# Patient Record
Sex: MALE | Race: WHITE | NOT HISPANIC OR LATINO | Employment: OTHER | URBAN - METROPOLITAN AREA
[De-identification: names, ages, dates, MRNs, and addresses within clinical notes are randomized per-mention and may not be internally consistent; named-entity substitution may affect disease eponyms.]

---

## 2018-02-21 DIAGNOSIS — M54.16 LUMBAR RADICULOPATHY: Primary | ICD-10-CM

## 2018-02-23 ENCOUNTER — HOSPITAL ENCOUNTER (OUTPATIENT)
Dept: RADIOLOGY | Facility: OTHER | Age: 83
Discharge: HOME | End: 2018-02-23
Attending: PAIN MEDICINE
Payer: MEDICARE

## 2018-02-23 DIAGNOSIS — M54.16 LUMBAR RADICULOPATHY: ICD-10-CM

## 2018-02-23 PROCEDURE — 72148 MRI LUMBAR SPINE W/O DYE: CPT | Mod: TC

## 2018-02-23 PROCEDURE — 72148 MRI LUMBAR SPINE W/O DYE: CPT | Mod: 26,,, | Performed by: RADIOLOGY

## 2018-07-31 ENCOUNTER — HOSPITAL ENCOUNTER (OUTPATIENT)
Facility: OTHER | Age: 83
Discharge: NURSING, REHAB OR LONG-TERM CARE | End: 2018-08-03
Attending: EMERGENCY MEDICINE | Admitting: HOSPITALIST
Payer: MEDICARE

## 2018-07-31 DIAGNOSIS — T14.90XA TRAUMA: ICD-10-CM

## 2018-07-31 DIAGNOSIS — S51.012A ELBOW LACERATION, LEFT, INITIAL ENCOUNTER: ICD-10-CM

## 2018-07-31 DIAGNOSIS — S32.810A: ICD-10-CM

## 2018-07-31 DIAGNOSIS — S32.030A CLOSED COMPRESSION FRACTURE OF THIRD LUMBAR VERTEBRA, INITIAL ENCOUNTER: ICD-10-CM

## 2018-07-31 DIAGNOSIS — N17.9 AKI (ACUTE KIDNEY INJURY): ICD-10-CM

## 2018-07-31 DIAGNOSIS — R53.81 DEBILITY: ICD-10-CM

## 2018-07-31 DIAGNOSIS — S32.444A CLOSED NONDISPLACED FRACTURE OF POSTERIOR COLUMN OF RIGHT ACETABULUM, INITIAL ENCOUNTER: ICD-10-CM

## 2018-07-31 DIAGNOSIS — I25.10 CORONARY ARTERY DISEASE INVOLVING NATIVE CORONARY ARTERY OF NATIVE HEART WITHOUT ANGINA PECTORIS: ICD-10-CM

## 2018-07-31 DIAGNOSIS — E78.2 MIXED HYPERLIPIDEMIA: ICD-10-CM

## 2018-07-31 DIAGNOSIS — D63.8 ANEMIA OF CHRONIC DISEASE: ICD-10-CM

## 2018-07-31 DIAGNOSIS — S32.810A MULTIPLE CLOSED FRACTURES OF PELVIS WITH DISRUPTION OF PELVIC RING, INITIAL ENCOUNTER: Primary | ICD-10-CM

## 2018-07-31 LAB
ANION GAP SERPL CALC-SCNC: 11 MMOL/L
BASOPHILS # BLD AUTO: ABNORMAL K/UL
BASOPHILS NFR BLD: 0 %
BUN SERPL-MCNC: 32 MG/DL
CALCIUM SERPL-MCNC: 9.4 MG/DL
CHLORIDE SERPL-SCNC: 105 MMOL/L
CO2 SERPL-SCNC: 20 MMOL/L
CREAT SERPL-MCNC: 1.5 MG/DL
DACRYOCYTES BLD QL SMEAR: ABNORMAL
DIFFERENTIAL METHOD: ABNORMAL
EOSINOPHIL # BLD AUTO: ABNORMAL K/UL
EOSINOPHIL NFR BLD: 0 %
ERYTHROCYTE [DISTWIDTH] IN BLOOD BY AUTOMATED COUNT: 14.2 %
EST. GFR  (AFRICAN AMERICAN): 45 ML/MIN/1.73 M^2
EST. GFR  (NON AFRICAN AMERICAN): 39 ML/MIN/1.73 M^2
GLUCOSE SERPL-MCNC: 97 MG/DL
HCT VFR BLD AUTO: 34.3 %
HGB BLD-MCNC: 11.3 G/DL
LYMPHOCYTES # BLD AUTO: ABNORMAL K/UL
LYMPHOCYTES NFR BLD: 24 %
MCH RBC QN AUTO: 31.3 PG
MCHC RBC AUTO-ENTMCNC: 32.9 G/DL
MCV RBC AUTO: 95 FL
MONOCYTES # BLD AUTO: ABNORMAL K/UL
MONOCYTES NFR BLD: 7 %
NEUTROPHILS NFR BLD: 62 %
NEUTS BAND NFR BLD MANUAL: 7 %
PLATELET # BLD AUTO: 177 K/UL
PMV BLD AUTO: 9.9 FL
POTASSIUM SERPL-SCNC: 4.6 MMOL/L
RBC # BLD AUTO: 3.61 M/UL
SODIUM SERPL-SCNC: 136 MMOL/L
WBC # BLD AUTO: 17.51 K/UL

## 2018-07-31 PROCEDURE — 12032 INTMD RPR S/A/T/EXT 2.6-7.5: CPT

## 2018-07-31 PROCEDURE — 80048 BASIC METABOLIC PNL TOTAL CA: CPT

## 2018-07-31 PROCEDURE — 99285 EMERGENCY DEPT VISIT HI MDM: CPT | Mod: 25

## 2018-07-31 PROCEDURE — 96374 THER/PROPH/DIAG INJ IV PUSH: CPT | Mod: 59

## 2018-07-31 PROCEDURE — 85027 COMPLETE CBC AUTOMATED: CPT

## 2018-07-31 PROCEDURE — 85007 BL SMEAR W/DIFF WBC COUNT: CPT | Mod: NCS

## 2018-07-31 PROCEDURE — 63600175 PHARM REV CODE 636 W HCPCS: Performed by: EMERGENCY MEDICINE

## 2018-07-31 PROCEDURE — G0378 HOSPITAL OBSERVATION PER HR: HCPCS

## 2018-07-31 RX ORDER — DORZOLAMIDE HCL 20 MG/ML
1 SOLUTION/ DROPS OPHTHALMIC 3 TIMES DAILY
COMMUNITY

## 2018-07-31 RX ORDER — NAPROXEN SODIUM 220 MG
220 TABLET ORAL DAILY PRN
COMMUNITY

## 2018-07-31 RX ORDER — CETIRIZINE HYDROCHLORIDE 5 MG/1
5 TABLET ORAL DAILY
Status: ON HOLD | COMMUNITY
End: 2018-08-01 | Stop reason: CLARIF

## 2018-07-31 RX ORDER — DULOXETIN HYDROCHLORIDE 20 MG/1
20 CAPSULE, DELAYED RELEASE ORAL DAILY
COMMUNITY

## 2018-07-31 RX ORDER — MORPHINE SULFATE 2 MG/ML
6 INJECTION, SOLUTION INTRAMUSCULAR; INTRAVENOUS
Status: COMPLETED | OUTPATIENT
Start: 2018-07-31 | End: 2018-07-31

## 2018-07-31 RX ORDER — LIDOCAINE HYDROCHLORIDE 30 MG/G
CREAM TOPICAL
Status: ON HOLD | COMMUNITY
End: 2018-08-01 | Stop reason: CLARIF

## 2018-07-31 RX ADMIN — MORPHINE SULFATE 6 MG: 2 INJECTION, SOLUTION INTRAMUSCULAR; INTRAVENOUS at 10:07

## 2018-08-01 PROBLEM — D63.8 ANEMIA OF CHRONIC DISEASE: Status: ACTIVE | Noted: 2018-08-01

## 2018-08-01 PROBLEM — R53.81 DEBILITY: Status: ACTIVE | Noted: 2018-08-01

## 2018-08-01 PROBLEM — N17.9 AKI (ACUTE KIDNEY INJURY): Status: ACTIVE | Noted: 2018-08-01

## 2018-08-01 PROBLEM — I25.10 CORONARY ARTERY DISEASE INVOLVING NATIVE CORONARY ARTERY OF NATIVE HEART WITHOUT ANGINA PECTORIS: Status: ACTIVE | Noted: 2018-08-01

## 2018-08-01 LAB
ALBUMIN SERPL BCP-MCNC: 3.6 G/DL
ALP SERPL-CCNC: 72 U/L
ALT SERPL W/O P-5'-P-CCNC: 19 U/L
ANION GAP SERPL CALC-SCNC: 8 MMOL/L
AST SERPL-CCNC: 31 U/L
BASOPHILS # BLD AUTO: 0.03 K/UL
BASOPHILS NFR BLD: 0.2 %
BILIRUB SERPL-MCNC: 0.9 MG/DL
BILIRUB UR QL STRIP: NEGATIVE
BUN SERPL-MCNC: 31 MG/DL
CALCIUM SERPL-MCNC: 8.7 MG/DL
CHLORIDE SERPL-SCNC: 106 MMOL/L
CHOLEST SERPL-MCNC: 141 MG/DL
CHOLEST/HDLC SERPL: 2.8 {RATIO}
CLARITY UR: CLEAR
CO2 SERPL-SCNC: 21 MMOL/L
COLOR UR: YELLOW
CREAT SERPL-MCNC: 1.3 MG/DL
DIFFERENTIAL METHOD: ABNORMAL
EOSINOPHIL # BLD AUTO: 0.1 K/UL
EOSINOPHIL NFR BLD: 0.9 %
ERYTHROCYTE [DISTWIDTH] IN BLOOD BY AUTOMATED COUNT: 14.2 %
EST. GFR  (AFRICAN AMERICAN): 53 ML/MIN/1.73 M^2
EST. GFR  (NON AFRICAN AMERICAN): 46 ML/MIN/1.73 M^2
GLUCOSE SERPL-MCNC: 127 MG/DL
GLUCOSE UR QL STRIP: NEGATIVE
HCT VFR BLD AUTO: 31.2 %
HDLC SERPL-MCNC: 51 MG/DL
HDLC SERPL: 36.2 %
HGB BLD-MCNC: 10.2 G/DL
HGB UR QL STRIP: NEGATIVE
KETONES UR QL STRIP: NEGATIVE
LDLC SERPL CALC-MCNC: 80.4 MG/DL
LEUKOCYTE ESTERASE UR QL STRIP: NEGATIVE
LYMPHOCYTES # BLD AUTO: 1.6 K/UL
LYMPHOCYTES NFR BLD: 12.3 %
MAGNESIUM SERPL-MCNC: 1.8 MG/DL
MCH RBC QN AUTO: 30.7 PG
MCHC RBC AUTO-ENTMCNC: 32.7 G/DL
MCV RBC AUTO: 94 FL
MONOCYTES # BLD AUTO: 1.6 K/UL
MONOCYTES NFR BLD: 12.3 %
NEUTROPHILS # BLD AUTO: 9.7 K/UL
NEUTROPHILS NFR BLD: 73.8 %
NITRITE UR QL STRIP: NEGATIVE
NONHDLC SERPL-MCNC: 90 MG/DL
PH UR STRIP: 7 [PH] (ref 5–8)
PHOSPHATE SERPL-MCNC: 3.5 MG/DL
PLATELET # BLD AUTO: 153 K/UL
PMV BLD AUTO: 10.2 FL
POTASSIUM SERPL-SCNC: 4 MMOL/L
PROT SERPL-MCNC: 6.5 G/DL
PROT UR QL STRIP: NEGATIVE
RBC # BLD AUTO: 3.32 M/UL
SODIUM SERPL-SCNC: 135 MMOL/L
SP GR UR STRIP: 1.01 (ref 1–1.03)
TRIGL SERPL-MCNC: 48 MG/DL
URN SPEC COLLECT METH UR: NORMAL
UROBILINOGEN UR STRIP-ACNC: NEGATIVE EU/DL
WBC # BLD AUTO: 13.18 K/UL

## 2018-08-01 PROCEDURE — 12032 INTMD RPR S/A/T/EXT 2.6-7.5: CPT

## 2018-08-01 PROCEDURE — G8979 MOBILITY GOAL STATUS: HCPCS | Mod: CK

## 2018-08-01 PROCEDURE — 83735 ASSAY OF MAGNESIUM: CPT

## 2018-08-01 PROCEDURE — 97530 THERAPEUTIC ACTIVITIES: CPT | Mod: 59

## 2018-08-01 PROCEDURE — 94761 N-INVAS EAR/PLS OXIMETRY MLT: CPT

## 2018-08-01 PROCEDURE — G0378 HOSPITAL OBSERVATION PER HR: HCPCS

## 2018-08-01 PROCEDURE — 81003 URINALYSIS AUTO W/O SCOPE: CPT

## 2018-08-01 PROCEDURE — 63600175 PHARM REV CODE 636 W HCPCS: Performed by: NURSE PRACTITIONER

## 2018-08-01 PROCEDURE — 97162 PT EVAL MOD COMPLEX 30 MIN: CPT

## 2018-08-01 PROCEDURE — 80061 LIPID PANEL: CPT

## 2018-08-01 PROCEDURE — G8978 MOBILITY CURRENT STATUS: HCPCS | Mod: CN

## 2018-08-01 PROCEDURE — 85025 COMPLETE CBC W/AUTO DIFF WBC: CPT

## 2018-08-01 PROCEDURE — 99220 PR INITIAL OBSERVATION CARE,LEVL III: CPT | Mod: GC,,, | Performed by: HOSPITALIST

## 2018-08-01 PROCEDURE — 84100 ASSAY OF PHOSPHORUS: CPT

## 2018-08-01 PROCEDURE — 27000221 HC OXYGEN, UP TO 24 HOURS

## 2018-08-01 PROCEDURE — 25000003 PHARM REV CODE 250: Performed by: NURSE PRACTITIONER

## 2018-08-01 PROCEDURE — 80053 COMPREHEN METABOLIC PANEL: CPT

## 2018-08-01 PROCEDURE — 25000003 PHARM REV CODE 250: Performed by: EMERGENCY MEDICINE

## 2018-08-01 RX ORDER — LATANOPROST 50 UG/ML
1 SOLUTION/ DROPS OPHTHALMIC NIGHTLY
Status: DISCONTINUED | OUTPATIENT
Start: 2018-08-01 | End: 2018-08-03 | Stop reason: HOSPADM

## 2018-08-01 RX ORDER — SODIUM CHLORIDE 9 MG/ML
INJECTION, SOLUTION INTRAVENOUS CONTINUOUS
Status: DISCONTINUED | OUTPATIENT
Start: 2018-08-01 | End: 2018-08-03 | Stop reason: HOSPADM

## 2018-08-01 RX ORDER — PRAVASTATIN SODIUM 20 MG/1
40 TABLET ORAL DAILY
Status: DISCONTINUED | OUTPATIENT
Start: 2018-08-01 | End: 2018-08-03 | Stop reason: HOSPADM

## 2018-08-01 RX ORDER — TRAMADOL HYDROCHLORIDE 50 MG/1
50 TABLET ORAL EVERY 4 HOURS PRN
Status: DISCONTINUED | OUTPATIENT
Start: 2018-08-01 | End: 2018-08-02

## 2018-08-01 RX ORDER — SODIUM CHLORIDE 0.9 % (FLUSH) 0.9 %
5 SYRINGE (ML) INJECTION
Status: DISCONTINUED | OUTPATIENT
Start: 2018-08-01 | End: 2018-08-03 | Stop reason: HOSPADM

## 2018-08-01 RX ORDER — ONDANSETRON 8 MG/1
8 TABLET, ORALLY DISINTEGRATING ORAL EVERY 8 HOURS PRN
Status: DISCONTINUED | OUTPATIENT
Start: 2018-08-01 | End: 2018-08-03 | Stop reason: HOSPADM

## 2018-08-01 RX ORDER — ACETAMINOPHEN 325 MG/1
650 TABLET ORAL EVERY 4 HOURS PRN
Status: DISCONTINUED | OUTPATIENT
Start: 2018-08-01 | End: 2018-08-03 | Stop reason: HOSPADM

## 2018-08-01 RX ORDER — MORPHINE SULFATE 2 MG/ML
2 INJECTION, SOLUTION INTRAMUSCULAR; INTRAVENOUS EVERY 4 HOURS PRN
Status: DISCONTINUED | OUTPATIENT
Start: 2018-08-01 | End: 2018-08-01

## 2018-08-01 RX ORDER — FAMOTIDINE 20 MG/1
20 TABLET, FILM COATED ORAL 2 TIMES DAILY
Status: DISCONTINUED | OUTPATIENT
Start: 2018-08-01 | End: 2018-08-03 | Stop reason: HOSPADM

## 2018-08-01 RX ORDER — FERROUS SULFATE, DRIED 160(50) MG
1 TABLET, EXTENDED RELEASE ORAL 2 TIMES DAILY WITH MEALS
Status: DISCONTINUED | OUTPATIENT
Start: 2018-08-01 | End: 2018-08-03 | Stop reason: HOSPADM

## 2018-08-01 RX ORDER — DORZOLAMIDE HCL 20 MG/ML
1 SOLUTION/ DROPS OPHTHALMIC 3 TIMES DAILY
Status: DISCONTINUED | OUTPATIENT
Start: 2018-08-01 | End: 2018-08-03 | Stop reason: HOSPADM

## 2018-08-01 RX ORDER — ASCORBIC ACID 250 MG
500 TABLET ORAL DAILY
Status: DISCONTINUED | OUTPATIENT
Start: 2018-08-01 | End: 2018-08-03 | Stop reason: HOSPADM

## 2018-08-01 RX ORDER — AMOXICILLIN 250 MG
1 CAPSULE ORAL EVERY OTHER DAY
Status: DISCONTINUED | OUTPATIENT
Start: 2018-08-01 | End: 2018-08-03 | Stop reason: HOSPADM

## 2018-08-01 RX ORDER — LIDOCAINE HYDROCHLORIDE 10 MG/ML
10 INJECTION INFILTRATION; PERINEURAL
Status: COMPLETED | OUTPATIENT
Start: 2018-08-01 | End: 2018-08-01

## 2018-08-01 RX ORDER — PREGABALIN 25 MG/1
25 CAPSULE ORAL DAILY
Status: DISCONTINUED | OUTPATIENT
Start: 2018-08-01 | End: 2018-08-03 | Stop reason: HOSPADM

## 2018-08-01 RX ORDER — ALFUZOSIN HYDROCHLORIDE 10 MG/1
10 TABLET, EXTENDED RELEASE ORAL
Status: DISCONTINUED | OUTPATIENT
Start: 2018-08-01 | End: 2018-08-03 | Stop reason: HOSPADM

## 2018-08-01 RX ORDER — FINASTERIDE 5 MG/1
5 TABLET, FILM COATED ORAL DAILY
Status: DISCONTINUED | OUTPATIENT
Start: 2018-08-01 | End: 2018-08-03 | Stop reason: HOSPADM

## 2018-08-01 RX ORDER — DULOXETIN HYDROCHLORIDE 20 MG/1
20 CAPSULE, DELAYED RELEASE ORAL DAILY
Status: DISCONTINUED | OUTPATIENT
Start: 2018-08-01 | End: 2018-08-03 | Stop reason: HOSPADM

## 2018-08-01 RX ORDER — HYDROCODONE BITARTRATE AND ACETAMINOPHEN 7.5; 325 MG/1; MG/1
1 TABLET ORAL EVERY 4 HOURS PRN
Status: DISCONTINUED | OUTPATIENT
Start: 2018-08-01 | End: 2018-08-02

## 2018-08-01 RX ADMIN — MORPHINE SULFATE 2 MG: 2 INJECTION, SOLUTION INTRAMUSCULAR; INTRAVENOUS at 02:08

## 2018-08-01 RX ADMIN — DORZOLAMIDE HYDROCHLORIDE 1 DROP: 20 SOLUTION/ DROPS OPHTHALMIC at 09:08

## 2018-08-01 RX ADMIN — ACETAMINOPHEN 650 MG: 325 TABLET, FILM COATED ORAL at 09:08

## 2018-08-01 RX ADMIN — ALFUZOSIN HYDROCHLORIDE 10 MG: 10 TABLET ORAL at 08:08

## 2018-08-01 RX ADMIN — LATANOPROST 1 DROP: 50 SOLUTION OPHTHALMIC at 09:08

## 2018-08-01 RX ADMIN — DULOXETINE HYDROCHLORIDE 20 MG: 20 CAPSULE, DELAYED RELEASE ORAL at 09:08

## 2018-08-01 RX ADMIN — FAMOTIDINE 20 MG: 20 TABLET ORAL at 09:08

## 2018-08-01 RX ADMIN — SODIUM CHLORIDE: 0.9 INJECTION, SOLUTION INTRAVENOUS at 02:08

## 2018-08-01 RX ADMIN — CALCIUM CARBONATE-VITAMIN D TAB 500 MG-200 UNIT 1 TABLET: 500-200 TAB at 09:08

## 2018-08-01 RX ADMIN — CALCIUM CARBONATE-VITAMIN D TAB 500 MG-200 UNIT 1 TABLET: 500-200 TAB at 04:08

## 2018-08-01 RX ADMIN — FINASTERIDE 5 MG: 5 TABLET, FILM COATED ORAL at 09:08

## 2018-08-01 RX ADMIN — PREGABALIN 25 MG: 25 CAPSULE ORAL at 09:08

## 2018-08-01 RX ADMIN — MORPHINE SULFATE 2 MG: 2 INJECTION, SOLUTION INTRAMUSCULAR; INTRAVENOUS at 09:08

## 2018-08-01 RX ADMIN — PRAVASTATIN SODIUM 40 MG: 20 TABLET ORAL at 09:08

## 2018-08-01 RX ADMIN — SODIUM CHLORIDE: 0.9 INJECTION, SOLUTION INTRAVENOUS at 10:08

## 2018-08-01 RX ADMIN — Medication 500 MG: at 09:08

## 2018-08-01 RX ADMIN — STANDARDIZED SENNA CONCENTRATE AND DOCUSATE SODIUM 1 TABLET: 8.6; 5 TABLET, FILM COATED ORAL at 09:08

## 2018-08-01 RX ADMIN — DORZOLAMIDE HYDROCHLORIDE 1 DROP: 20 SOLUTION/ DROPS OPHTHALMIC at 03:08

## 2018-08-01 RX ADMIN — LIDOCAINE HYDROCHLORIDE 10 ML: 10 INJECTION, SOLUTION INFILTRATION; PERINEURAL at 12:08

## 2018-08-01 RX ADMIN — BETAXOLOL HYDROCHLORIDE 1 DROP: 2.8 SUSPENSION/ DROPS OPHTHALMIC at 11:08

## 2018-08-01 NOTE — PLAN OF CARE
Problem: Patient Care Overview  Goal: Plan of Care Review  Outcome: Ongoing (interventions implemented as appropriate)  Plan of Care reviewed with patient and daughter. Pt free from falls or injury. Pt transferred from bed to chair with PT earlier today. Denies any c/o discomfort @ this time. Pt is incontinent of B/B function with incontinent care provided as needed. Purposeful rounding done. Pt oriented to person and place. Confused to time and situation. Dsg to Rt elbow CDI. Turned and repositioned q2h. Daughter @ bedside. Call light in reach. Bed alarm on and functioning well. Bed in lowest position and locked.

## 2018-08-01 NOTE — PLAN OF CARE
Pt was disoriented x3 and daughter, Ana M [phone] present and complete discharge assessment, verified PCP.  Pt is a resident at [facility] nursing unit.  Pt's wife is also a resident at [place] on memory care unit.  Pt has RW and WC.  Daughter reported pt was cognitive intact per to admit.  Pt's daughter can provide transportation upon discharge.  NOTE:  Daughter Ana M lives in [place].     08/01/18 1242   Discharge Assessment   Assessment Type Discharge Planning Assessment   Confirmed/corrected address and phone number on facesheet? Yes   Assessment information obtained from? (Ana M, daughter)   Communicated expected length of stay with patient/caregiver no   Prior to hospitilization cognitive status: Alert/Oriented   Prior to hospitalization functional status: Partially Dependent;Assistive Equipment;Needs Assistance   Current cognitive status: Not Oriented to Place;Not Oriented to Time   Current Functional Status: Partially Dependent   Lives With facility resident   Able to Return to Prior Arrangements yes   Is patient able to care for self after discharge? No   Patient's perception of discharge disposition (nursing home)   Readmission Within The Last 30 Days no previous admission in last 30 days   Patient currently being followed by outpatient case management? No   Patient currently receives any other outside agency services? No   Equipment Currently Used at Home walker, rolling;wheelchair   Do you have any problems affording any of your prescribed medications? No   Is the patient taking medications as prescribed? yes   Does the patient have transportation home? Yes   Transportation Available family or friend will provide   Does the patient receive services at [facility]? No   Discharge Plan A Return to nursing home   Patient/Family In Agreement With Plan yes

## 2018-08-01 NOTE — SUBJECTIVE & OBJECTIVE
Past Medical History:   Diagnosis Date    Arthritis     BPH (benign prostatic hyperplasia)     CAD (coronary artery disease)     Edema     Falls     GERD (gastroesophageal reflux disease)     Glaucoma     HLD (hyperlipidemia)     Macular degeneration     Neuropathy     Seasonal allergies     Skin cancer     Vitamin B 12 deficiency     Vitamin D deficiency        Past Surgical History:   Procedure Laterality Date    BACK SURGERY         Review of patient's allergies indicates:  No Known Allergies    No current facility-administered medications on file prior to encounter.      Current Outpatient Prescriptions on File Prior to Encounter   Medication Sig    alfuzosin (UROXATRAL) 10 mg Tb24 Take 10 mg by mouth daily with breakfast.    ASPIRIN (ASPIR-81 ORAL) Take by mouth.    FINASTERIDE ORAL Take 5 mg by mouth.     latanoprost 0.005 % ophthalmic solution 1 drop every evening.    PRAVASTATIN SODIUM (PRAVASTATIN ORAL) Take 40 mg by mouth once daily.     PREGABALIN (LYRICA ORAL) Take 25 mg by mouth.     ranitidine (ZANTAC) 150 MG capsule Take 150 mg by mouth 2 (two) times daily.    ascorbic acid (VITAMIN C) 500 MG tablet Take 500 mg by mouth once daily.    calcium-vitamin D3 (CALCIUM 500 + D) 500 mg(1,250mg) -200 unit per tablet Take 1 tablet by mouth 2 (two) times daily with meals.    MULTIVIT-IRON-MIN-FOLIC ACID 3,500-18-0.4 UNIT-MG-MG ORAL CHEW Take by mouth.    omega-3 fatty acids-vitamin E (FISH OIL) 1,000 mg Cap Take by mouth.    senna-docusate 8.6-50 mg (SENNA WITH DOCUSATE SODIUM) 8.6-50 mg per tablet Take 1 tablet by mouth every other day.     Family History     None        Social History Main Topics    Smoking status: Former Smoker    Smokeless tobacco: Not on file    Alcohol use No    Drug use: No    Sexual activity: Not on file     Review of Systems   Constitutional: Negative for activity change, appetite change, fatigue and fever.   HENT: Negative for congestion, ear pain and  postnasal drip.    Eyes: Negative for discharge.   Respiratory: Negative for apnea, shortness of breath and wheezing.    Cardiovascular: Negative for chest pain and leg swelling.   Gastrointestinal: Negative for abdominal distention, abdominal pain, nausea and vomiting.   Endocrine: Negative for polydipsia, polyphagia and polyuria.   Genitourinary: Negative for difficulty urinating, flank pain, frequency, hematuria and urgency.   Musculoskeletal: Positive for arthralgias, back pain, gait problem and myalgias. Negative for joint swelling.   Skin: Negative for pallor and rash.   Allergic/Immunologic: Negative for environmental allergies and food allergies.   Neurological: Negative for dizziness, speech difficulty, weakness, light-headedness and headaches.   Hematological: Does not bruise/bleed easily.   Psychiatric/Behavioral: Negative for agitation.     Objective:     Vital Signs (Most Recent):  Temp: 97.6 °F (36.4 °C) (07/31/18 2056)  Pulse: 102 (07/31/18 2334)  Resp: 17 (07/31/18 2056)  BP: (!) 162/68 (07/31/18 2334)  SpO2: 95 % (07/31/18 2358) Vital Signs (24h Range):  Temp:  [97.6 °F (36.4 °C)] 97.6 °F (36.4 °C)  Pulse:  [] 102  Resp:  [17] 17  SpO2:  [94 %-98 %] 95 %  BP: (140-197)/(64-87) 162/68     Weight: 68.9 kg (152 lb)  Body mass index is 20.61 kg/m².    Physical Exam   Constitutional: He is oriented to person, place, and time. He appears well-developed. He appears ill.   HENT:   Head: Normocephalic.   Eyes: Conjunctivae are normal.   Neck: Normal range of motion. Neck supple.   Cardiovascular: Regular rhythm, normal heart sounds and intact distal pulses.  Tachycardia present.    Pulses:       Radial pulses are 2+ on the right side, and 2+ on the left side.        Dorsalis pedis pulses are 1+ on the right side, and 1+ on the left side.   Pulmonary/Chest: Effort normal. He has decreased breath sounds in the right lower field and the left lower field.   Abdominal: Soft. He exhibits no distension. Bowel  sounds are increased. There is no tenderness.   Musculoskeletal: Normal range of motion.        Lumbar back: He exhibits tenderness and pain.   Neurological: He is alert and oriented to person, place, and time. GCS eye subscore is 4. GCS verbal subscore is 5. GCS motor subscore is 6.   Skin: Skin is warm and dry. There is pallor.   Psychiatric: He has a normal mood and affect. His speech is normal and behavior is normal.           Significant Labs:   CBC:   Recent Labs  Lab 07/31/18 2136   WBC 17.51*   HGB 11.3*   HCT 34.3*        CMP:   Recent Labs  Lab 07/31/18 2136      K 4.6      CO2 20*   GLU 97   BUN 32*   CREATININE 1.5*   CALCIUM 9.4   ANIONGAP 11   EGFRNONAA 39*       Significant Imaging: I have reviewed all pertinent imaging results/findings within the past 24 hours.

## 2018-08-01 NOTE — PLAN OF CARE
Problem: Physical Therapy Goal  Goal: Physical Therapy Goal  Goals to be met by: 8/15/18    Patient will increase functional independence with mobility by performin. Supine to sit with min A.  2. Sit to supine with min A.   3. Rolling R and L with min A using log roll technique.   4. Sitting at edge of bed >5 minutes with CGA.   5. PT will assess sit<>stand.     Outcome: Ongoing (interventions implemented as appropriate)  Patient evaluated by PT. Pt requires total assist for bed mobility using log roll technique. He tolerates sitting up at edge of bed x 4 minutes, duration limited by reported L hip and thigh pain 10/10 requesting to return to supine. Pt drowsy throughout session. Will continue to follow and progress as tolerated. Please see progress note for detailed plan of care and recommendations (pending improved alertness and improved activity tolerance).

## 2018-08-01 NOTE — ASSESSMENT & PLAN NOTE
CT- Complex, mildly displaced fracture of the right acetabulum involving the anterior and posterior columns and medial wall with extension along the superior pubic ramus.  Minimally displaced fracture of the right inferior pubic ramus.  Compression fracture of the L3 vertebral body, progressed when compared to the previous exam noting retropulsion of the posterior-superior corner that contributes to severe spinal canal stenosis.  Moderate bilateral femoroacetabular osteoarthritis     Consult Ortho  Morphine for pain control  PT/OT consult after Ortho gives recommendations

## 2018-08-01 NOTE — PT/OT/SLP EVAL
"Physical Therapy Evaluation and Treatment    Patient Name:  Tushar Ybarra   MRN:  [id number]    Recommendations:     Discharge Recommendations:   (pending pain control, activity tolerance, increased alertness)   Discharge Equipment Recommendations:  (pending progress)   Barriers to discharge: None with return to nursing care at [facility]    Assessment:     Tushar Ybarra is a 89 y.o. male admitted with a medical diagnosis of Multiple closed pelvic fractures with disruption of pelvic [place].  He presents with the following impairments/functional limitations:  pain, decreased ROM, decreased lower extremity function, impaired self care skills, impaired functional mobilty, weakness, impaired endurance, orthopedic precautions (impaired alertness). Patient evaluated by PT. Pt requires total assist for bed mobility using log roll technique. He tolerates sitting up at edge of bed x 4 minutes, duration limited by reported L hip and thigh pain 10/10 requesting to return to supine. Pt drowsy throughout session. Will continue to follow and progress as tolerated.     Rehab Prognosis:  Fair; patient would benefit from acute skilled PT services to address these deficits and reach maximum level of function.      Recent Surgery: * No surgery found *      Plan:     During this hospitalization, patient to be seen daily to address the above listed problems via gait training, therapeutic activities, therapeutic exercises, neuromuscular re-education, wheelchair management/training  · Plan of Care Expires:  08/31/18   Plan of Care Reviewed with: patient    Subjective     Communicated with nurse prior to session.  Patient found supine in bed with HOB elevated upon PT entry to room, agreeable to evaluation.      Chief Complaint: pain  Patient comments/goals: agreeable to sit up at the edge of the bed; "I can't do this anymore" and "let me back down" when sitting up at edge of bed  Pain/Comfort:  · Pain Rating 1: 10/10 (with " "activity )  · Location - Side 1: Left  · Location 1:  (hip and anterior thigh)  · Pain Addressed 1: Pre-medicate for activity, Cessation of Activity, Nurse notified  · Pain Rating Post-Intervention 1: 0/10 (at rest)    Patients cultural, spiritual, Jehovah's witness conflicts given the current situation: none specified    Living Environment:  Pt is a resident of [facility] in the nursing home section on the first floor "in the main building." He has a w/c accessible bathroom with grab bars.    Prior to admission, patients level of function was mod I with rollator for household distances. He has assist in the facility for dressing, however will intermittently dress himself in the facility; he has the option to use assist for toileting, however typically toilets without requesting assistance. He feeds himself. The facility assists with food prep and cleaning. Patient uses the following equipment: rollator, wheelchair. Pt wll have access to nursing home DME  Upon discharge, patient will have assistance from nursing home staff.    Objective:     Patient found with: oxygen (3L/min via nasal cannula)     General Precautions: Standard,  (fall risk)   Orthopedic Precautions:spinal precautions, RLE partial weight bearing   Braces: N/A     Exams:  Cognition: Patient is oriented to name and  and follows approximately 50% of one step verbal commands. Pt presented with impaired alertness with occasional verbal cues for eyes open during session.    Posture:    -       Rounded shoulders  -       Forward head  -       Kyphosis  Sensation: No c/o paresthesias. Pain as above.   Skin Integrity: Visible skin intact  Edema: None noted  Coordination: No coordination impairments identified with functional mobility. No formal testing performed.  LE ROM/Strength: Pt demonstrated significantly impaired ROM at hips and knees with hip flexion and knee extension, approximately 2+/5. Unable to perform SLR, no heel clearance with attempt " for R short arc quad, heel clearance with L short arc quad  Tone: No tone impairments identified during functional mobility.   Vital signs: SpO2: 98% on 3L/min via nasal cannula; Heart rate 86 bpm    Functional Mobility:  Bed Mobility:     Supine to Sit: total assist using log roll technique  Sit to Supine: total assist using log roll technique with verbal cues for sequencing  Rolling R and L: total assist using log roll technique with verbal cues for sequencing  Bridging: total assist with draw sheet [facility]  Transfers:      Did not perform; unable to tolerate sitting prior to attempt to stand  Balance: CGA for static sitting at edge of bed x 4 minutes with c/o worsening pain. Pt unable to perform lateral scooting with cues.       AM-PAC 6 CLICK MOBILITY  Total Score:6       Therapeutic Activities and Exercises:  -Discussed with patient and daughter PT plan of care, safety with OOB mobility, log roll technique, repositioning for pressure relief, floating heels  -Total assist for floating heels at end session. Discussed with SERGIO Schneider recommendations for wedge pillow and q2 turns due to pt's impaired mobility.       Patient left supine in bed with HOB elevated > 60 degrees with all lines intact, call button in reach, bed alarm on, nurse notified and daughter present.    GOALS:    Physical Therapy Goals        Problem: Physical Therapy Goal    Goal Priority Disciplines Outcome Goal Variances Interventions   Physical Therapy Goal     PT/OT, PT Ongoing (interventions implemented as appropriate)     Description:  Goals to be met by: 8/15/18    Patient will increase functional independence with mobility by performin. Supine to sit with min A.  2. Sit to supine with min A.   3. Rolling R and L with min A using log roll technique.   4. Sitting at edge of bed >5 minutes with CGA.   5. PT will assess sit<>stand.                       History:     Past Medical History:   Diagnosis Date    Arthritis      BPH (benign prostatic hyperplasia)     CAD (coronary artery disease)     Edema     Falls     GERD (gastroesophageal reflux disease)     Glaucoma     HLD (hyperlipidemia)     Macular degeneration     Neuropathy     Seasonal allergies     Skin cancer     Vitamin B 12 deficiency     Vitamin D deficiency        Past Surgical History:   Procedure Laterality Date    BACK SURGERY         Clinical Decision Making:     History  Co-morbidities and personal factors that may impact the plan of care Examination  Body Structures and Functions, activity limitations and participation restrictions that may impact the plan of care Clinical Presentation   Decision Making/ Complexity Score   Co-morbidities:   [] Time since onset of injury / illness / exacerbation  [] Status of current condition  []Patient's cognitive status and safety concerns    [] Multiple Medical Problems (see med hx)  Personal Factors:   [] Patient's age  [] Prior Level of function   [] Patient's home situation (environment and family support)  [] Patient's level of motivation  [] Expected progression of patient      HISTORY:(criteria)    [] 73513 - no personal factors/history    [] 83879 - has 1-2 personal factor/comorbidity     [] 01567 - has >3 personal factor/comorbidity     Body Regions:  [] Objective examination findings  [] Head     []  Neck  [] Trunk   [] Upper Extremity  [] Lower Extremity    Body Systems:  [] For communication ability, affect, cognition, language, and learning style: the assessment of the ability to make needs known, consciousness, orientation (person, place, and time), expected emotional /behavioral responses, and learning preferences (eg, learning barriers, education  needs)  [] For the neuromuscular system: a general assessment of gross coordinated movement (eg, balance, gait, locomotion, transfers, and transitions) and motor function  (motor control and motor learning)  [] For the musculoskeletal system: the assessment of  gross symmetry, gross range of motion, gross strength, height, and weight  [] For the integumentary system: the assessment of pliability(texture), presence of scar formation, skin color, and skin integrity  [] For cardiovascular/pulmonary system: the assessment of heart rate, respiratory rate, blood pressure, and edema     Activity limitations:    [] Patient's cognitive status and saf ety concerns          [] Status of current condition      [] Weight bearing restriction  [] Cardiopulmunary Restriction    Participation Restrictions:   [] Goals and goal agreement with the patient     [] Rehab potential (prognosis) and probable outcome      Examination of Body System: (criteria)    [] 18863 - addressing 1-2 elements    [] 40618 - addressing a total of 3 or more elements     [] 87492 -  Addressing a total of 4 or more elements         Clinical Presentation: (criteria)  Choose one     On examination of body system using standardized tests and measures patient presents with (CHOOSE ONE) elements from any of the following: body structures and functions, activity limitations, and/or participation restrictions.  Leading to a clinical presentation that is considered (CHOOSE ONE)                              Clinical Decision Making  (Eval Complexity):  Choose One     Time Tracking:     PT Received On: 08/01/18  PT Start Time: 1153     PT Stop Time: 1228  PT Total Time (min): 35 min     Billable Minutes: Evaluation 15 and Therapeutic Activity 20      Alison Daigle, PT  08/01/2018

## 2018-08-01 NOTE — PT/OT/SLP PROGRESS
Occupational Therapy      Patient Name:  Tushar Ybarra   MRN:  [id number]    OT evaluation and treatment orders received. Will complete evaluation s/p orthopedic consult.     Ivet Chen OTR/L  8/1/2018

## 2018-08-01 NOTE — PHARMACY MED REC
"Admission Medication Reconciliation - Pharmacy Consult Note    The home medication history was taken by Saloni Kingston.  Based on information gathered and subsequent review by the clinical pharmacist, the items below may need attention.     You may go to "Admission" then "Reconcile Home Medications" tabs to review and/or act upon these items.     Potentially problematic discrepancies with current MAR  o Patient IS NOT taking the following which was ordered upon admit  o Ascorbic acid 500 mg  o Calcium-vitamin D3 500 mg-200 units  o Patient is taking a drug DIFFERENTLY than how ordered upon admit  o Betaxolol 0.25% opth instill 1 drop into LEFT eye twice daily  - Inpatient order = instill 1 drop into BOTH eyes once daily    Prescriptions Prior to Admission   Medication Sig Dispense Refill Last Dose    alfuzosin (UROXATRAL) 10 mg Tb24 Take 10 mg by mouth daily with breakfast.   7/31/2018    ASPIRIN (ASPIR-81 ORAL) Take 1 tablet by mouth once daily.    7/31/2018    betaxolol (BETOPTIC S) 0.25 % DrpS Place 1 drop into the left eye 2 (two) times daily.    7/31/2018    dorzolamide (TRUSOPT) 2 % ophthalmic solution Place 1 drop into both eyes 3 (three) times daily.    7/31/2018    DULoxetine (CYMBALTA) 20 MG capsule Take 20 mg by mouth once daily.   7/31/2018    FINASTERIDE ORAL Take 5 mg by mouth once daily.    7/31/2018    latanoprost 0.005 % ophthalmic solution Place 1 drop into both eyes every evening.    7/31/2018    naproxen sodium (ANAPROX) 220 MG tablet Take 220 mg by mouth daily as needed.    7/31/2018    PRAVASTATIN SODIUM (PRAVASTATIN ORAL) Take 40 mg by mouth once daily.    7/31/2018    pregabalin (LYRICA) 25 MG capsule Take 25 mg by mouth once daily.    7/31/2018    ranitidine (ZANTAC) 150 MG capsule Take 150 mg by mouth 2 (two) times daily.   7/31/2018    MULTIVIT-IRON-MIN-FOLIC ACID 3,500-18-0.4 UNIT-MG-MG ORAL CHEW Take by mouth.        Please address this information as you see fit.  Feel " free to contact us if you have any questions or require assistance.    Wali Lugo, Pharm.D., BCPS  [phone]  .  .

## 2018-08-01 NOTE — H&P
[facility] Medicine  History & Physical    Patient Name: Tushar Ybarra  MRN: [id number]  Admission Date: 7/31/2018  Attending Physician: Shayne Rinaldi MD   Primary Care Provider: Dutch Umana MD         Patient information was obtained from patient and ER records.     Subjective:     Principal Problem:Multiple closed pelvic fractures with disruption of pelvic [place]    Chief Complaint:   Chief Complaint   Patient presents with    Fall     EMS reports fall in bathroom, c/o chronic back pain, no LOC, pt not on blood thinners        HPI: The patient is a 89 y.o. male, with history of arthritis and CAD, who presents s/p fall. Patient reports he was ambulating when he turned and fell. He is unsure if he hit his head on the floor, but denies LOC. He reports chronic back pain, bilateral hip pain, generalized body aches, and wound to right arm. He denies neck pain, shoulder pain, elbow pain, wrist pain, or headache. He reports history of back surgery.    Upon CT imaging, patient is found to have multiple pelvic fractures and a worsening compression fracture at L3.  Patient will be admitted for ortho consultation and pain management.    Past Medical History:   Diagnosis Date    Arthritis     BPH (benign prostatic hyperplasia)     CAD (coronary artery disease)     Edema     Falls     GERD (gastroesophageal reflux disease)     Glaucoma     HLD (hyperlipidemia)     Macular degeneration     Neuropathy     Seasonal allergies     Skin cancer     Vitamin B 12 deficiency     Vitamin D deficiency        Past Surgical History:   Procedure Laterality Date    BACK SURGERY         Review of patient's allergies indicates:  No Known Allergies    No current facility-administered medications on file prior to encounter.      Current Outpatient Prescriptions on File Prior to Encounter   Medication Sig    alfuzosin (UROXATRAL) 10 mg Tb24 Take 10 mg by mouth daily with breakfast.    ASPIRIN  (ASPIR-81 ORAL) Take by mouth.    FINASTERIDE ORAL Take 5 mg by mouth.     latanoprost 0.005 % ophthalmic solution 1 drop every evening.    PRAVASTATIN SODIUM (PRAVASTATIN ORAL) Take 40 mg by mouth once daily.     PREGABALIN (LYRICA ORAL) Take 25 mg by mouth.     ranitidine (ZANTAC) 150 MG capsule Take 150 mg by mouth 2 (two) times daily.    ascorbic acid (VITAMIN C) 500 MG tablet Take 500 mg by mouth once daily.    calcium-vitamin D3 (CALCIUM 500 + D) 500 mg(1,250mg) -200 unit per tablet Take 1 tablet by mouth 2 (two) times daily with meals.    MULTIVIT-IRON-MIN-FOLIC ACID 3,500-18-0.4 UNIT-MG-MG ORAL CHEW Take by mouth.    omega-3 fatty acids-vitamin E (FISH OIL) 1,000 mg Cap Take by mouth.    senna-docusate 8.6-50 mg (SENNA WITH DOCUSATE SODIUM) 8.6-50 mg per tablet Take 1 tablet by mouth every other day.     Family History     None        Social History Main Topics    Smoking status: Former Smoker    Smokeless tobacco: Not on file    Alcohol use No    Drug use: No    Sexual activity: Not on file     Review of Systems   Constitutional: Negative for activity change, appetite change, fatigue and fever.   HENT: Negative for congestion, ear pain and postnasal drip.    Eyes: Negative for discharge.   Respiratory: Negative for apnea, shortness of breath and wheezing.    Cardiovascular: Negative for chest pain and leg swelling.   Gastrointestinal: Negative for abdominal distention, abdominal pain, nausea and vomiting.   Endocrine: Negative for polydipsia, polyphagia and polyuria.   Genitourinary: Negative for difficulty urinating, flank pain, frequency, hematuria and urgency.   Musculoskeletal: Positive for arthralgias, back pain, gait problem and myalgias. Negative for joint swelling.   Skin: Negative for pallor and rash.   Allergic/Immunologic: Negative for environmental allergies and food allergies.   Neurological: Negative for dizziness, speech difficulty, weakness, light-headedness and headaches.    Hematological: Does not bruise/bleed easily.   Psychiatric/Behavioral: Negative for agitation.     Objective:     Vital Signs (Most Recent):  Temp: 97.6 °F (36.4 °C) (07/31/18 2056)  Pulse: 102 (07/31/18 2334)  Resp: 17 (07/31/18 2056)  BP: (!) 162/68 (07/31/18 2334)  SpO2: 95 % (07/31/18 2358) Vital Signs (24h Range):  Temp:  [97.6 °F (36.4 °C)] 97.6 °F (36.4 °C)  Pulse:  [] 102  Resp:  [17] 17  SpO2:  [94 %-98 %] 95 %  BP: (140-197)/(64-87) 162/68     Weight: 68.9 kg (152 lb)  Body mass index is 20.61 kg/m².    Physical Exam   Constitutional: He is oriented to person, place, and time. He appears well-developed. He appears ill.   HENT:   Head: Normocephalic.   Eyes: Conjunctivae are normal.   Neck: Normal range of motion. Neck supple.   Cardiovascular: Regular rhythm, normal heart sounds and intact distal pulses.  Tachycardia present.    Pulses:       Radial pulses are 2+ on the right side, and 2+ on the left side.        Dorsalis pedis pulses are 1+ on the right side, and 1+ on the left side.   Pulmonary/Chest: Effort normal. He has decreased breath sounds in the right lower field and the left lower field.   Abdominal: Soft. He exhibits no distension. Bowel sounds are increased. There is no tenderness.   Musculoskeletal: Normal range of motion.        Lumbar back: He exhibits tenderness and pain.   Neurological: He is alert and oriented to person, place, and time. GCS eye subscore is 4. GCS verbal subscore is 5. GCS motor subscore is 6.   Skin: Skin is warm and dry. There is pallor.   Psychiatric: He has a normal mood and affect. His speech is normal and behavior is normal.           Significant Labs:   CBC:   Recent Labs  Lab 07/31/18 2136   WBC 17.51*   HGB 11.3*   HCT 34.3*        CMP:   Recent Labs  Lab 07/31/18 2136      K 4.6      CO2 20*   GLU 97   BUN 32*   CREATININE 1.5*   CALCIUM 9.4   ANIONGAP 11   EGFRNONAA 39*       Significant Imaging: I have reviewed all pertinent  imaging results/findings within the past 24 hours.    Assessment/Plan:     * Multiple closed pelvic fractures with disruption of pelvic [place]    CT- Complex, mildly displaced fracture of the right acetabulum involving the anterior and posterior columns and medial wall with extension along the superior pubic ramus.  Minimally displaced fracture of the right inferior pubic ramus.  Compression fracture of the L3 vertebral body, progressed when compared to the previous exam noting retropulsion of the posterior-superior corner that contributes to severe spinal canal stenosis.  Moderate bilateral femoroacetabular osteoarthritis     Consult Ortho  Morphine for pain control  PT/OT consult after Ortho gives recommendations              Closed compression fracture of third lumbar vertebra    CT-  Acute on chronic moderate to severe compression fracture of the L3 vertebral body which has progressed compared to previous MRI from 02/23/2018.  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  Stable remote severe L1 vertebral body compression fracture.  Stable remote mild-to-moderate T7 vertebral body compression fracture.    Ortho consult  Adequate pain control  PT/OT        CATRACHO (acute kidney injury)    Creatinine 1.5, baseline around 1.    NS at 50/hr  CMP in AM        Hyperlipidemia    Lipid Panel pending    Continue pravastatin            VTE Risk Mitigation     None             Gary Lau NP  Department of Hospital Medicine   [facility]

## 2018-08-01 NOTE — CONSULTS
DATE OF CONSULTATION:  08/01/2018.    REQUESTING PHYSICIAN:  Dr. Ram.    REASON FOR CONSULTATION:  Pelvic fracture.    HISTORY OF PRESENT ILLNESS:  The patient is a 89-year-old male who was at the   [facility], slipped and fell and injured his right hip and pelvis and   sustained some bruising to the elbow and wrist.  He does have a history of   chronic spinal stenosis in the lumbar spine, had previous compression fracture   of the lumbar spine.  The patient was admitted complaining of back pain and   right-sided hip and pelvic pain.    PAST MEDICAL HISTORY:  Remarkable for arthritis, BPH, coronary artery disease   and neuropathy.  The patient has been ambulatory with a walker for short   distances, wheelchair for long distances.    PHYSICAL EXAMINATION:  Right hip is at full range of motion.  He does have some   tenderness with internal and external rotation, but there is no gross deformity.    Dorsalis pedis, posterior tibial pulses intact.  He is able to flex and extend   both feet and extensor hallucis longus function is intact.  He does have some   soreness over the mid lumbar spine with mild spasm.    RADIOGRAPHS:  CT scan of the pelvis shows a complex fracture of the right hip   acetabulum with minimal displacement.  It does extend to the inferior and   superior pubic rami on the right side.  Lumbar spine shows acute on chronic   fracture changes at the L3 vertebra.  There was a retropulsed fragment at that   level.    IMPRESSION:  Status post fall with right acetabular fracture with pubic rami   fracture as well as L3 compression fracture with retropulsed fragment.  The   patient neurologically intact.    PLAN:  At this point, given the patient's activity level, I would recommend   nonoperative treatment.  I would recommend bed to chair transfers with partial   weightbearing to the right side.  If his back continues to worsen, there is a   chance he may require decompression.  However, at his age, I  think this would be   fraught with problems, possible lumbar corset may be helpful.  I did speak to   his daughter in regard to this plan and she is in agreement with this treatment   plan.  We will hopefully get the patient mobile enough to return to  [place]   home.      TPF/HN  dd: 08/01/2018 08:23:22 (CDT)  td: 08/01/2018 14:45:42 (CDT)  Doc ID   #[id number]  Job ID #[id number]    CC:

## 2018-08-01 NOTE — HPI
The patient is a 89 y.o. male, with history of arthritis and CAD, who presents s/p fall. Patient reports he was ambulating when he turned and fell. He is unsure if he hit his head on the floor, but denies LOC. He reports chronic back pain, bilateral hip pain, generalized body aches, and wound to right arm. He denies neck pain, shoulder pain, elbow pain, wrist pain, or headache. He reports history of back surgery.    Upon CT imaging, patient is found to have multiple pelvic fractures and a worsening compression fracture at L3.  Patient will be admitted for ortho consultation and pain management.

## 2018-08-01 NOTE — PLAN OF CARE
Problem: Patient Care Overview  Goal: Plan of Care Review  Outcome: Ongoing (interventions implemented as appropriate)  Patient on 2L NC saturations 100% with no distress noted.

## 2018-08-01 NOTE — CONSULTS
Consulted for skin tear to right elbow.  Patient with dressing intact to skin tear.  ED note states tear was sutured with 9 sutures.  Dressing to remain intact.

## 2018-08-01 NOTE — ED PROVIDER NOTES
Encounter Date: 7/31/2018    SCRIBE #1 NOTE: I, Ivonne Gloria, am scribing for, and in the presence of, Dr. Rinaldi.       History     Chief Complaint   Patient presents with    Fall     EMS reports fall in bathroom, c/o chronic back pain, no LOC, pt not on blood thinners     Time seen by provider: 9:11 PM    This is a 89 y.o. male, with history of arthritis and CAD, who presents s/p fall. Patient reports he was ambulating when he turned and fell. He is unsure if he hit his head on the floor, but denies LOC. He reports chronic back pain, bilateral hip pain, generalized body aches, and wound to right arm. He denies neck pain, shoulder pain, elbow pain, wrist pain, or headache. He reports history of back surgery.      The history is provided by the patient.     Review of patient's allergies indicates:  No Known Allergies  Past Medical History:   Diagnosis Date    Arthritis     BPH (benign prostatic hyperplasia)     CAD (coronary artery disease)     Edema     Falls     GERD (gastroesophageal reflux disease)     Glaucoma     HLD (hyperlipidemia)     Macular degeneration     Neuropathy     Seasonal allergies     Skin cancer     Vitamin B 12 deficiency     Vitamin D deficiency      Past Surgical History:   Procedure Laterality Date    BACK SURGERY       History reviewed. No pertinent family history.  Social History   Substance Use Topics    Smoking status: Former Smoker    Smokeless tobacco: Not on file    Alcohol use No     Review of Systems   Constitutional: Negative for fever.   HENT: Negative for sore throat.    Respiratory: Negative for shortness of breath.    Cardiovascular: Negative for chest pain.   Gastrointestinal: Negative for abdominal pain, nausea and vomiting.   Genitourinary: Negative for dysuria.   Musculoskeletal: Positive for arthralgias (hips), back pain and myalgias. Negative for neck pain.        Negative for shoulder, elbow, or wrist pain.   Skin: Positive for wound. Negative for  rash.   Neurological: Negative for syncope, weakness, numbness and headaches.   Hematological: Does not bruise/bleed easily.       Physical Exam     Initial Vitals [07/31/18 2056]   BP Pulse Resp Temp SpO2   (!) 187/81 77 17 97.6 °F (36.4 °C) 98 %      MAP       --         Physical Exam    Nursing note and vitals reviewed.  Constitutional: He appears well-developed and well-nourished. He is not diaphoretic. No distress.   HENT:   Head: Normocephalic and atraumatic. Head is without laceration.   Right Ear: No hemotympanum.   Left Ear: No hemotympanum.   No depressed skull fracture.   Eyes: Conjunctivae and EOM are normal. No scleral icterus.   Neck: Normal range of motion. Neck supple.   Cardiovascular: Normal rate, regular rhythm and normal heart sounds. Exam reveals no gallop and no friction rub.    No murmur heard.  Pulmonary/Chest: Breath sounds normal. No respiratory distress. He has no wheezes. He has no rhonchi. He has no rales. He exhibits no tenderness.   Musculoskeletal:   Midline spinal tenderness lumbar through cervical region without step offs or deformities. No posterior ecchymosis or lacerations. Right elbow without bony tenderness. Painless ROM. LUE painless ROM of all joints. Pelvis tender. Stable to rock. Bilateral hips are tender w/o deformity. Femur and knees are non tender. Tibfib non tender bilaterally. Ankles non tender. Painful ROM of bilateral hips.   Neurological: He is alert and oriented to person, place, and time.   Skin: Skin is warm and dry.   Right elbow with irregular 5 cm skin tear with laceration into subcutaneous with some bone visible. No fractures. Rest of extremities uninjured. LUE has no skin lacerations or lesions.   Psychiatric: He has a normal mood and affect. His behavior is normal. Judgment and thought content normal.         ED Course   Lac Repair  Date/Time: 8/1/2018 12:27 AM  Performed by: REMI BRITO.  Authorized by: REMI BRITO.   Consent Done: Not Needed  Body  area: upper extremity  Location details: right elbow  Laceration length: 5 cm  Foreign bodies: no foreign bodies  Tendon involvement: none  Nerve involvement: none  Vascular damage: no  Anesthesia: local infiltration    Anesthesia:  Local Anesthetic: lidocaine 1% without epinephrine  Anesthetic total: 4 mL  Patient sedated: no  Irrigation solution: saline  Irrigation method: syringe  Amount of cleaning: extensive  Debridement: none  Degree of undermining: none  Wound skin closure material used: Superficial skin tear: 4-0 Ethilon (3 sutures)  Wound subcutaneous closure material used: 3-0 Ethilon (6 sutures)  Number of sutures: 9  Technique: simple  Dressing: 4x4 sterile gauze  Patient tolerance: Patient tolerated the procedure well with no immediate complications  Comments: No overlying dermis to repair.        Labs Reviewed   CBC W/ AUTO DIFFERENTIAL - Abnormal; Notable for the following:        Result Value    WBC 17.51 (*)     RBC 3.61 (*)     Hemoglobin 11.3 (*)     Hematocrit 34.3 (*)     MCH 31.3 (*)     All other components within normal limits   BASIC METABOLIC PANEL - Abnormal; Notable for the following:     CO2 20 (*)     BUN, Bld 32 (*)     Creatinine 1.5 (*)     eGFR if  45 (*)     eGFR if non  39 (*)     All other components within normal limits          Imaging Results          X-Ray Elbow Complete Right (Final result)  Result time 07/31/18 23:08:09    Final result by Jesús Suresh MD (07/31/18 23:08:09)                 Impression:      1. No displaced fractures.  2. Suspected subcutaneous laceration overlying the olecranon.      Electronically signed by: Jesús Suresh MD  Date:    07/31/2018  Time:    23:08             Narrative:    EXAMINATION:  XR ELBOW COMPLETE 3 VIEW RIGHT    CLINICAL HISTORY:  . Laceration without foreign body of left elbow, initial encounter    TECHNIQUE:  AP, lateral views of the right elbow were performed.  Oblique view is limited due to  technique.    COMPARISON:  None    FINDINGS:  There is generalized osteopenia.  No displaced fractures.  No osseous destruction.  No joint effusions.  There is soft tissue irregularity overlying the olecranon.  There is distal triceps enthesopathy.  No radiopaque foreign bodies.                               X-Ray Chest 1 View (Final result)  Result time 07/31/18 22:47:05    Final result by Jesús Suresh MD (07/31/18 22:47:05)                 Impression:      1. Chronic lung changes.  No focal airspace opacity.  2. Compression fractures of the T7 and L1 vertebral bodies.      Electronically signed by: Jesús Suresh MD  Date:    07/31/2018  Time:    22:47             Narrative:    EXAMINATION:  XR CHEST 1 VIEW    CLINICAL HISTORY:  Injury, unspecified, initial encounter    TECHNIQUE:  Single frontal view of the chest was performed.    COMPARISON:  Radiograph 01/04/2012.    FINDINGS:  Mediastinal structures are midline.  Hilar contours are grossly unremarkable.  Cardiac silhouette is magnified by AP technique.  Lung volumes are symmetric.  Increased interstitial markings are noted throughout both lungs, presumably related to chronic lung changes.  No focal airspace opacity.  No pneumothorax or pleural effusions.  Compression fracture of the T7 and L1 vertebral bodies, incompletely evaluated.  No free air beneath the diaphragm.                               CT Pelvis Without Contrast (Final result)  Result time 07/31/18 22:33:20    Final result by Jesús Suresh MD (07/31/18 22:33:20)                 Impression:      1. Complex, mildly displaced fracture of the right acetabulum involving the anterior and posterior columns and medial wall with extension along the superior pubic ramus.  2. Minimally displaced fracture of the right inferior pubic ramus.  3. Compression fracture of the L3 vertebral body, progressed when compared to the previous exam noting retropulsion of the posterior-superior corner that  contributes to severe spinal canal stenosis.  4. Moderate bilateral femoroacetabular osteoarthritis as detailed above.      Electronically signed by: Jesús Suresh MD  Date:    07/31/2018  Time:    22:33             Narrative:    EXAMINATION:  CT PELVIS WITHOUT CONTRAST    CLINICAL HISTORY:  Pelvis trauma, fx known or suspected, xray insufficient;    TECHNIQUE:  1.25 mm axial images were obtained through the pelvis without the use of contrast.  Coronal and sagittal reformats were performed.    COMPARISON:  Radiograph 06/06/2014.    FINDINGS:  The following fractures are identified:    *Complex, mildly displaced fracture line that involves the anterior and posterior acetabular columns and the medial acetabular wall with extension along the superior pubic ramus.  *Minimally displaced fracture of the inferior pubic ramus.  *There is a compression fracture of the L3 vertebral body with associated retropulsion of the posterior-superior corner that results in severe spinal canal stenosis, incompletely visualized on this exam.  SI joints are symmetric.  Pubic symphysis demonstrate hypertrophic changes.    There is demineralization of the femoral heads.  There is marked cartilage space narrowing involving the bilateral femoroacetabular joints with associated subchondral sclerosis and axial migration of the femoral heads.  There is perilabral ossification about the acetabular edge which contributes to acetabular over coverage.  There is slight a sphericity at the bilateral femoral head/neck junctions.    There is mild soft tissue swelling about the right femoroacetabular joint without discrete hematoma.  Multiple colonic diverticula are identified.  The appendix is normal.  There is marked atherosclerotic calcification of the abdominal aorta.  Suspected low-attenuation lesion noted within the right renal cortex, possibly a cyst.                               CT Lumbar Spine Without Contrast (Final result)  Result time  07/31/18 22:45:36    Final result by Kervin Moss MD (07/31/18 22:45:36)                 Impression:      1. Acute on chronic moderate to severe compression fracture of the L3 vertebral body which has progressed compared to previous MRI from 02/23/2018.  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  2. Stable remote severe L1 vertebral body compression fracture.  3. Stable remote mild-to-moderate T7 vertebral body compression fracture.  4. Partially visualized right acetabular fracture.  See separate CT pelvis report for full details.      Electronically signed by: Kervin Moss MD  Date:    07/31/2018  Time:    22:45             Narrative:    EXAMINATION:  CT THORACIC SPINE WITHOUT CONTRAST; CT LUMBAR SPINE WITHOUT CONTRAST    CLINICAL HISTORY:  Polytrauma, critical, T/L spine inj suspected;    TECHNIQUE:  CT of the thoracic spine and lumbar spine were performed without the use of IV contrast.    COMPARISON:  MRI lumbar spine from 02/23/2018; thoracic spine radiographs from October 2015.    FINDINGS:  Mild motion limited exam.    Thoracic spine:    Thoracic spinal alignment is within normal limits.  Multilevel degenerative changes are seen.  There is stable mild to moderate anterior wedge compression fracture seen of the T7 vertebral body.    Lumbar spine: There is stable severe remote compression fracture of the L1 vertebral body.  There is acute on chronic moderate to severe compression fracture of the L3 vertebral body with retropulsion into the anterior spinal canal resulting in severe spinal canal stenosis at the L2-3 level.  L3 compression fracture has progressed from previous MRI.  Lumbar spine alignment appears stable with prominent multilevel degenerative changes again seen.    Partially visualized right acetabular fracture is seen. There is cholelithiasis.  Extensive aortic atherosclerosis is seen.  Scarring is seen within the  bilateral lung apices, right greater than left.                               CT Thoracic Spine Without Contrast (Final result)  Result time 07/31/18 22:45:36    Final result by Kervin Moss MD (07/31/18 22:45:36)                 Impression:      1. Acute on chronic moderate to severe compression fracture of the L3 vertebral body which has progressed compared to previous MRI from 02/23/2018.  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  2. Stable remote severe L1 vertebral body compression fracture.  3. Stable remote mild-to-moderate T7 vertebral body compression fracture.  4. Partially visualized right acetabular fracture.  See separate CT pelvis report for full details.      Electronically signed by: Kervin Moss MD  Date:    07/31/2018  Time:    22:45             Narrative:    EXAMINATION:  CT THORACIC SPINE WITHOUT CONTRAST; CT LUMBAR SPINE WITHOUT CONTRAST    CLINICAL HISTORY:  Polytrauma, critical, T/L spine inj suspected;    TECHNIQUE:  CT of the thoracic spine and lumbar spine were performed without the use of IV contrast.    COMPARISON:  MRI lumbar spine from 02/23/2018; thoracic spine radiographs from October 2015.    FINDINGS:  Mild motion limited exam.    Thoracic spine:    Thoracic spinal alignment is within normal limits.  Multilevel degenerative changes are seen.  There is stable mild to moderate anterior wedge compression fracture seen of the T7 vertebral body.    Lumbar spine: There is stable severe remote compression fracture of the L1 vertebral body.  There is acute on chronic moderate to severe compression fracture of the L3 vertebral body with retropulsion into the anterior spinal canal resulting in severe spinal canal stenosis at the L2-3 level.  L3 compression fracture has progressed from previous MRI.  Lumbar spine alignment appears stable with prominent multilevel degenerative changes again seen.    Partially  visualized right acetabular fracture is seen. There is cholelithiasis.  Extensive aortic atherosclerosis is seen.  Scarring is seen within the bilateral lung apices, right greater than left.                               CT Cervical Spine Without Contrast (Final result)  Result time 07/31/18 22:15:08    Final result by Kervin Moss MD (07/31/18 22:15:08)                 Impression:      No evidence of acute cervical spine fracture or dislocation.      Electronically signed by: Kervin Moss MD  Date:    07/31/2018  Time:    22:15             Narrative:    EXAMINATION:  CT CERVICAL SPINE WITHOUT CONTRAST    CLINICAL HISTORY:  C-spine trauma, NEXUS/CCR positive, +risk factor(s);    TECHNIQUE:  Low dose axial images, sagittal and coronal reformations were performed though the cervical spine.  Contrast was not administered.    COMPARISON:  CT cervical spine from December 2011.    FINDINGS:  Mild motion limited exam.    No evidence of acute cervical spine fracture or dislocation.  Odontoid process is intact.  Degenerative changes and spurring are visualized at the atlantoaxial joint.  Additional multilevel degenerative changes and prominent facet arthropathy are seen throughout the cervical levels.  Craniocervical junction is unremarkable.  Cervical spine alignment is within normal limits.    Mild asymmetric enlargement seen of the right thyroid lobe.  Surrounding soft tissues otherwise show no significant abnormalities.  Airway is patent.  There is bilateral apical scarring, more prominent on the right.  This appears largely unchanged from 2011.                               CT Head Without Contrast (Final result)  Result time 07/31/18 22:11:35    Final result by Kervin Moss MD (07/31/18 22:11:35)                 Impression:      No acute intracranial abnormalities identified.      Electronically signed by: Kervin Moss MD  Date:    07/31/2018  Time:    22:11             Narrative:    EXAMINATION:  CT HEAD  WITHOUT CONTRAST    CLINICAL HISTORY:  Head trauma, headache;    TECHNIQUE:  Low dose axial images were obtained through the head.  Coronal and sagittal reformations were also performed. Contrast was not administered.    COMPARISON:  CT head from June 2014.    FINDINGS:  There is generalized cerebral volume loss with chronic microvascular ischemic disease.  No evidence of acute/recent major vascular distribution cerebral infarction, intraparenchymal hemorrhage, or intra-axial space occupying lesion. The ventricular system is normal in size and configuration with no evidence of hydrocephalus. No effacement of the skull-base cisterns. No abnormal extra-axial fluid collections or blood products. The visualized paranasal sinuses and mastoid air cells are clear. The calvarium shows no significant abnormality.                              X-Rays:   Independently Interpreted Readings:   Chest X-Ray: Chronic changes. No effusions or opacities. No acutely displaced rib fractures.   Other Readings:  Right Elbow: No obvious fracture or dislocation.    Medical Decision Making:   Clinical Tests:   Lab Tests: Ordered and Reviewed  Radiological Study: Ordered and Reviewed  ED Management:  Elderly patient presents after a trip and fall.  No syncopal event.  Had consciousness throughout the entire event.  Believes that he did strike his head.  No signs of trauma. No blood thinners.  Given his advanced age CT scan obtained. No acute abnormalities.  He has long history of back pain with previous surgeries and compression fractures.  I have reviewed these prior charts and imaging.  I do elect to obtain CT scan of his entire spine given this history and his diffuse pain.  This does reveal worsening compression fractures as well as possible new compression fractures.  Also noted on his pelvic CT scan are several fractures of his pubic rami as well as his right acetabulum.  These are often non operative.  Given his advanced age and his  amount of pain, I do not think he is a candidate to go home for outpatient rehab.  Will hospitalize him for orthopedic evaluation the morning, and likely placement in a rehabilitation facility.  Of note he also has significant skin tear over his right elbow.  The bone itself is not tender.  X-ray reveals no fracture.  After cleaning the wound, we do note that some portions of the skin tear have progressed all way through the subcutaneous tissue and bone is visible.  This is thoroughly irrigated to prevent infection.  I closed overlying subcutaneous tissue as described above.  Few portions of the thin friable skin are also able to be repaired.  Much of the subcutaneous tissue was left open to heal by secondary intention as there is no overlying dermis remaining to repair.  Patient will require wound care consult.  I have placed this.  At this time I do not think prophylactic antibiotics indicated.  Discussed with family and patient.    NICK Rinaldi M.D.  08/01/2018  1:10 AM      11:18 PM - Discussed case with Doug Lau NP, and will admit patient to Dr. Ram.            Scribe Attestation:   Scribe #1: I performed the above scribed service and the documentation accurately describes the services I performed. I attest to the accuracy of the note.    Attending Attestation:           Physician Attestation for Scribe:  Physician Attestation Statement for Scribe #1: I, Dr. Rinaldi, reviewed documentation, as scribed by Ivonne Gloria in my presence, and it is both accurate and complete.                    Clinical Impression:     1. Multiple closed fractures of pelvis with disruption of pelvic ring, initial encounter    2. Elbow laceration, left, initial encounter    3. Trauma    4. Closed nondisplaced fracture of posterior column of right acetabulum, initial encounter    5. Closed compression fracture of third lumbar vertebra, initial encounter                                 Shayne Rinaldi MD  08/01/18 0110

## 2018-08-02 LAB
ALBUMIN SERPL BCP-MCNC: 3.1 G/DL
ALP SERPL-CCNC: 57 U/L
ALT SERPL W/O P-5'-P-CCNC: 17 U/L
ANION GAP SERPL CALC-SCNC: 9 MMOL/L
AST SERPL-CCNC: 32 U/L
BASOPHILS # BLD AUTO: 0.06 K/UL
BASOPHILS NFR BLD: 0.6 %
BILIRUB SERPL-MCNC: 0.9 MG/DL
BUN SERPL-MCNC: 23 MG/DL
CALCIUM SERPL-MCNC: 8.9 MG/DL
CHLORIDE SERPL-SCNC: 110 MMOL/L
CO2 SERPL-SCNC: 18 MMOL/L
CREAT SERPL-MCNC: 1.1 MG/DL
DIFFERENTIAL METHOD: ABNORMAL
EOSINOPHIL # BLD AUTO: 0.7 K/UL
EOSINOPHIL NFR BLD: 6.3 %
ERYTHROCYTE [DISTWIDTH] IN BLOOD BY AUTOMATED COUNT: 14.3 %
EST. GFR  (AFRICAN AMERICAN): >60 ML/MIN/1.73 M^2
EST. GFR  (NON AFRICAN AMERICAN): 56 ML/MIN/1.73 M^2
GLUCOSE SERPL-MCNC: 111 MG/DL
HCT VFR BLD AUTO: 29.5 %
HGB BLD-MCNC: 9.6 G/DL
LYMPHOCYTES # BLD AUTO: 1.8 K/UL
LYMPHOCYTES NFR BLD: 17.1 %
MAGNESIUM SERPL-MCNC: 1.9 MG/DL
MCH RBC QN AUTO: 30.5 PG
MCHC RBC AUTO-ENTMCNC: 32.5 G/DL
MCV RBC AUTO: 94 FL
MONOCYTES # BLD AUTO: 1.4 K/UL
MONOCYTES NFR BLD: 13.8 %
NEUTROPHILS # BLD AUTO: 6.4 K/UL
NEUTROPHILS NFR BLD: 62 %
PHOSPHATE SERPL-MCNC: 2.7 MG/DL
PLATELET # BLD AUTO: 135 K/UL
PMV BLD AUTO: 10.1 FL
POTASSIUM SERPL-SCNC: 4.2 MMOL/L
PROT SERPL-MCNC: 6.1 G/DL
RBC # BLD AUTO: 3.15 M/UL
SODIUM SERPL-SCNC: 137 MMOL/L
WBC # BLD AUTO: 10.33 K/UL

## 2018-08-02 PROCEDURE — 92610 EVALUATE SWALLOWING FUNCTION: CPT

## 2018-08-02 PROCEDURE — 92526 ORAL FUNCTION THERAPY: CPT

## 2018-08-02 PROCEDURE — G0378 HOSPITAL OBSERVATION PER HR: HCPCS

## 2018-08-02 PROCEDURE — 99900035 HC TECH TIME PER 15 MIN (STAT)

## 2018-08-02 PROCEDURE — 84100 ASSAY OF PHOSPHORUS: CPT

## 2018-08-02 PROCEDURE — 99225 PR SUBSEQUENT OBSERVATION CARE,LEVEL II: CPT | Mod: ,,, | Performed by: HOSPITALIST

## 2018-08-02 PROCEDURE — 85025 COMPLETE CBC W/AUTO DIFF WBC: CPT

## 2018-08-02 PROCEDURE — 97166 OT EVAL MOD COMPLEX 45 MIN: CPT

## 2018-08-02 PROCEDURE — 94761 N-INVAS EAR/PLS OXIMETRY MLT: CPT

## 2018-08-02 PROCEDURE — 36415 COLL VENOUS BLD VENIPUNCTURE: CPT

## 2018-08-02 PROCEDURE — G8988 SELF CARE GOAL STATUS: HCPCS | Mod: CL

## 2018-08-02 PROCEDURE — G8997 SWALLOW GOAL STATUS: HCPCS | Mod: CJ

## 2018-08-02 PROCEDURE — 97535 SELF CARE MNGMENT TRAINING: CPT

## 2018-08-02 PROCEDURE — G8996 SWALLOW CURRENT STATUS: HCPCS | Mod: CK

## 2018-08-02 PROCEDURE — G8987 SELF CARE CURRENT STATUS: HCPCS | Mod: CM

## 2018-08-02 PROCEDURE — 80053 COMPREHEN METABOLIC PANEL: CPT

## 2018-08-02 PROCEDURE — 25000003 PHARM REV CODE 250: Performed by: HOSPITALIST

## 2018-08-02 PROCEDURE — 25000003 PHARM REV CODE 250: Performed by: NURSE PRACTITIONER

## 2018-08-02 PROCEDURE — 83735 ASSAY OF MAGNESIUM: CPT

## 2018-08-02 RX ORDER — TRAMADOL HYDROCHLORIDE 50 MG/1
50 TABLET ORAL EVERY 4 HOURS PRN
Status: DISCONTINUED | OUTPATIENT
Start: 2018-08-02 | End: 2018-08-02

## 2018-08-02 RX ORDER — HYDROCODONE BITARTRATE AND ACETAMINOPHEN 5; 325 MG/1; MG/1
1 TABLET ORAL EVERY 6 HOURS PRN
Status: DISCONTINUED | OUTPATIENT
Start: 2018-08-02 | End: 2018-08-03 | Stop reason: HOSPADM

## 2018-08-02 RX ADMIN — PREGABALIN 25 MG: 25 CAPSULE ORAL at 09:08

## 2018-08-02 RX ADMIN — LATANOPROST 1 DROP: 50 SOLUTION OPHTHALMIC at 09:08

## 2018-08-02 RX ADMIN — CALCIUM CARBONATE-VITAMIN D TAB 500 MG-200 UNIT 1 TABLET: 500-200 TAB at 04:08

## 2018-08-02 RX ADMIN — BETAXOLOL HYDROCHLORIDE 1 DROP: 2.8 SUSPENSION/ DROPS OPHTHALMIC at 09:08

## 2018-08-02 RX ADMIN — HYDROCODONE BITARTRATE AND ACETAMINOPHEN 1 TABLET: 5; 325 TABLET ORAL at 07:08

## 2018-08-02 RX ADMIN — FAMOTIDINE 20 MG: 20 TABLET ORAL at 09:08

## 2018-08-02 RX ADMIN — HYDROCODONE BITARTRATE AND ACETAMINOPHEN 1 TABLET: 7.5; 325 TABLET ORAL at 02:08

## 2018-08-02 RX ADMIN — Medication 500 MG: at 09:08

## 2018-08-02 RX ADMIN — ACETAMINOPHEN 650 MG: 325 TABLET, FILM COATED ORAL at 11:08

## 2018-08-02 RX ADMIN — DORZOLAMIDE HYDROCHLORIDE 1 DROP: 20 SOLUTION/ DROPS OPHTHALMIC at 09:08

## 2018-08-02 RX ADMIN — ALFUZOSIN HYDROCHLORIDE 10 MG: 10 TABLET ORAL at 09:08

## 2018-08-02 RX ADMIN — CALCIUM CARBONATE-VITAMIN D TAB 500 MG-200 UNIT 1 TABLET: 500-200 TAB at 09:08

## 2018-08-02 RX ADMIN — DORZOLAMIDE HYDROCHLORIDE 1 DROP: 20 SOLUTION/ DROPS OPHTHALMIC at 03:08

## 2018-08-02 RX ADMIN — FINASTERIDE 5 MG: 5 TABLET, FILM COATED ORAL at 09:08

## 2018-08-02 RX ADMIN — TRAMADOL HYDROCHLORIDE 50 MG: 50 TABLET, FILM COATED ORAL at 12:08

## 2018-08-02 RX ADMIN — SODIUM CHLORIDE: 0.9 INJECTION, SOLUTION INTRAVENOUS at 07:08

## 2018-08-02 RX ADMIN — DULOXETINE HYDROCHLORIDE 20 MG: 20 CAPSULE, DELAYED RELEASE ORAL at 09:08

## 2018-08-02 RX ADMIN — PRAVASTATIN SODIUM 40 MG: 20 TABLET ORAL at 09:08

## 2018-08-02 RX ADMIN — HYDROCODONE BITARTRATE AND ACETAMINOPHEN 1 TABLET: 7.5; 325 TABLET ORAL at 07:08

## 2018-08-02 NOTE — PLAN OF CARE
Problem: Occupational Therapy Goal  Goal: Occupational Therapy Goal  Goals to be met by: 08/16/18     Patient will increase functional independence with ADLs by performing:    UE Dressing with Moderate Assistance.  Grooming while EOB with Minimal Assistance.  Sitting at edge of bed x10 minutes with Contact Guard Assistance.  Rolling to Left with Moderate Assistance.   Supine to sit with Moderate Assistance.  Increased functional strength to 4/5 for bed mobility, ADL tasks and functional transfers.    Outcome: Ongoing (interventions implemented as appropriate)  OT evaluation completed, with treatment to follow.  Patient with impaired cognition, significant pain with all movement.  Only tolerated sitting EOB, Total assist for all bed mobility.  Impaired basic ADL task performance as well.  Continue OT services to maximize patient performance.    Comments: MAAME Cordero, 8/2/2018

## 2018-08-02 NOTE — PLAN OF CARE
Problem: Patient Care Overview  Goal: Plan of Care Review  Outcome: Ongoing (interventions implemented as appropriate)  Patient on 2L NC saturations 97% with no distress noted.

## 2018-08-02 NOTE — PLAN OF CARE
Problem: SLP Goal  Goal: SLP Goal  1. Pt will be able to consume mechanical soft diet with thin liquids without signs of airway threat or aspiration given moderate assistance.   2. Pt will follow through with safe swallowing strategies/aspiration precautions with 100% acc given moderate assistance.  3. Ongoing assessment of dysphagia and cognitive communication status.    Outcome: Ongoing (interventions implemented as appropriate)  Pt seen bedside to monitor mechanical soft diet tolerance. Pt demonstrating continued confusional state resulting in decreased safety during meals. SLP recommended downgrade of diet to puree until cognitive status and safety awareness improves.    Comments: Speech therapy to continue to follow up 4-6x per week for ongoing assessment and remediation of dysphagia.

## 2018-08-02 NOTE — PLAN OF CARE
Problem: Patient Care Overview  Goal: Plan of Care Review  Outcome: Ongoing (interventions implemented as appropriate)  Alert with episodes of confusion. Complaint of pain to back. Pain managed with po pain medication. Dressing to right elbow intact. Turned q2hrs. Tolerates meds well crushed in pudding. IV to right forearm intact and infusing. Incontinent of bowel and bladder. Condom catheter in place. Tele monitoring in place.

## 2018-08-02 NOTE — PLAN OF CARE
Problem: SLP Goal  Goal: SLP Goal  1. Pt will be able to consume mechanical soft diet with thin liquids without signs of airway threat or aspiration given moderate assistance.   2. Pt will follow through with safe swallowing strategies/aspiration precautions with 100% acc given moderate assistance.  3. Ongoing assessment of dysphagia and cognitive communication status.   Outcome: Ongoing (interventions implemented as appropriate)  Bedside swallow evaluation completed this date.     Comments: Speech therapy to follow up 4-6x per week for ongoing assessment and remediation of dysphagia and cognitive communication status.

## 2018-08-02 NOTE — ASSESSMENT & PLAN NOTE
Ortho consult noted  Adjust Tramadol for adequate pain control  Continue PT/OT  Discharge planning in progress

## 2018-08-02 NOTE — NURSING
Handoff given to John HILL. No significant events. Remains free from fall, injury, and skin breakdown. Heel boot protectors and body repositioning wedge applied. Incontinence care performed as needed; condom cath applied for urinary incontinence. VSS on 2L/O2 via nasal cannula. Pain moderately controlled with PO meds. Meds crushed and administered with pudding; thickener applied to liquids. Tele maintained; all alarms active and audible. Incisions/dressings CDI. TEDs/SCDs in place. Plan of care reviewed with patient and all questions answered. Bed low, locked. Call light within reach. Purposeful rounding performed. Resting comfortably in bed, daughter is at the bedside, no other complaints at this time.

## 2018-08-02 NOTE — PLAN OF CARE
Problem: Patient Care Overview  Goal: Plan of Care Review  Outcome: Ongoing (interventions implemented as appropriate)  Plan of care reviewed with patient and daughter. Disorientated to place, time and situation. VSS and afebrile throughout shift. incontinence care completed. Patient tolerating soft diet and crushed pills. Purposeful rounding complete. Call light at bedside, bed at lowest position, brakes on, non-skid socks on. Will continue to monitor

## 2018-08-02 NOTE — PT/OT/SLP PROGRESS
Speech Language Pathology Treatment    Patient Name:  Tushar Ybarra   MRN:  [id number]  Admitting Diagnosis: Multiple closed pelvic fractures with disruption of pelvic [place]    Recommendations:                General Recommendations:                1. Speech therapy to follow up 4-6x per week for ongoing assessment and remediation of dysphagia and cognitive communication status               2. Pt requires 1:1 assistance with meals      Diet recommendations:  Pureed solids, Thin (3-5 mls via spoon with aspiration precautions)     Aspiration Precautions:   1. 1 bite/sip at a time,   2. Assistance with meals,   3. Avoid talking while eating,   4. Double swallow with each bite/sip,   5. Eliminate distractions,   6. Feed only when awake/alert,   7. Meds crushed in puree,   8. Monitor for s/s of aspiration,   9. No straws,   10. Remain upright 30 minutes post meal,   11. Small bites/sips (thin liquids provided as 3-5 mls via spoon)  12. Standard aspiration precautions      General Precautions: Standard, aspiration, pureed diet    Subjective     · Pt awake, alert, agreeable, upright in bed awaiting mechanical soft lunch plate. Daughter reports change in medication administration in attempt to remediate confusional state; however, she's reporting continued confused language and inconsistent alertness. Daughter reports that she suspects pt may have aspirated thin water during a whole pill administration. Pt reportedly masticated the pill prior to swallowing, and thin liquid was administered in effort to clear oral residuals.     Objective:     Has the patient been evaluated by SLP for swallowing?   Yes  Keep patient NPO? No   Current Respiratory Status: nasal cannula      COGNITIVE COMMUNICATION: Pt continues to demonstrate increased confusion; however, increased alertness and duration of alertness noted during this session. Pt oriented to name and family members present with 100%. Unable to identify place, reason for  hospitalization, year. Pt required max assistance to sustain attention for 5 min feeding task. Pt able to follow 1 step commands related to feeding task with max cues approx 50% acc. Pt noted with increased distractibility and confused language throughout session. Required max assistance to follow through on all safe swallow recommendations/aspiration precautions. Pt continues to be reliant on communication partner to anticipate wants and needs.     SWALLOWING: Pt demonstrated cough, vocal quality change, and throat clear on 3/3x bites of mechanical soft diet. Pt demonstrated prolonged mastication of mechanical soft solids and required mod verbal cues to swallow bolus. Moderate oral residuals noted after first swallow.  Pt tolerated 3/3x presentations of 3 mls of thin liquid via spoon without overt s/s of aspiration or airway threat. Pt required presentation of dry spoon to elicit second swallow in all trials - was unable to follow verbal command to demonstrate second swallow. Pt tolerated 4/4x tsp presentations of pudding without overt s/s of aspiration or airway threat. With puree (pudding), pt demonstrated timely prep and swallow of bolus (no prolonged mastication or oral holding noted) in 3/3x trials. Pt required max verbal cues to decrease talking while eating in all oral trials.     EDUCATION: Daughter educated on aspiration precautions and safe swallowing strategies: decrease talking while eating, HOB to 90 degrees, second swallow with each bite, small bites and sips, 1:1 assistance with meals, feed only when alert. SLP also recommended downgrade current diet to puree until cognitive status and safety awareness improves. It was recommended that speech therapy continue management of dysphagia at next level of care. Daughter demonstrated verbal understanding and documented recommendations and strategies.     Assessment:     Tushar Ybarra is a 89 y.o. male with an SLP diagnosis of dysphagia and decreased  safety awareness due to cognitive communication status. SLP recommended downgrade of mechanical soft diet to pureed diet with thin liquids pending improvement in cognition. It is recommended that speech therapy continue at next level of care for ongoing assessment and remediation of dysphagia and cognitive communication status.   Goals:    SLP Goals        Problem: SLP Goal    Goal Priority Disciplines Outcome   SLP Goal     SLP Ongoing (interventions implemented as appropriate)   Description:  1. Pt will be able to consume mechanical soft diet with thin liquids without signs of airway threat or aspiration given moderate assistance.   2. Pt will follow through with safe swallowing strategies/aspiration precautions with 100% acc given moderate assistance.  3. Ongoing assessment of dysphagia and cognitive communication status.                     Plan:     · Patient to be seen:  4 x/week, 6 x/week   · Plan of Care expires:  08/10/18  · Plan of Care reviewed with:  patient, daughter, other (see comments) (RN)   · SLP Follow-Up:  Yes       Discharge recommendations:  (pending progress with pain control and improved level of cognition)    Time Tracking:     SLP Treatment Date:   08/02/18  Speech Start Time:  1225  Speech Stop Time:  1255     Speech Total Time (min):  30 min    Billable Minutes: Treatment Swallowing Dysfunction 30 minutes    Maynor Savage CCC-SLP  08/02/2018

## 2018-08-02 NOTE — NURSING
"Consulted speech after patient had difficulties with swallowing pills. Speech recommended a pureed diet and crushed pills. Informed Dr. Mackenzie of family concerns over patient taking Tramadol. Daughter stated, " he get anxious and agitated after a second dose of Tramadol." will continue to monitor   "

## 2018-08-02 NOTE — PROGRESS NOTES
[facility]  Orthopedics  Progress Note    Patient Name: Tushar Ybarra  MRN: [id number]  Admission Date: 7/31/2018  Hospital Length of Stay: 0 days  Attending Provider: Wendy Ram MD  Primary Care Provider: Dutch Umana MD    Subjective:     Principal Problem:Multiple closed pelvic fractures with disruption of pelvic [place]    Interval History: no changes o/n. PT able to get up at bedside but NWB    Review of patient's allergies indicates:   Allergen Reactions    Tramadol Other (See Comments)     Intolerance       Current Facility-Administered Medications   Medication    0.9%  NaCl infusion    acetaminophen tablet 650 mg    alfuzosin 24 hr tablet 10 mg    ascorbic acid (vitamin C) tablet 500 mg    betaxolol 0.25 % ophthalmic suspension 1 drop    calcium-vitamin D3 500 mg(1,250mg) -200 unit per tablet 1 tablet    dorzolamide 2 % ophthalmic solution 1 drop    DULoxetine DR capsule 20 mg    echothiophate 0.125 % ophthalmic solution    famotidine tablet 20 mg    finasteride tablet 5 mg    HYDROcodone-acetaminophen 5-325 mg per tablet 1 tablet    latanoprost 0.005 % ophthalmic solution 1 drop    ondansetron disintegrating tablet 8 mg    pravastatin tablet 40 mg    pregabalin capsule 25 mg    senna-docusate 8.6-50 mg per tablet 1 tablet    sodium chloride 0.9% flush 5 mL     Objective:     Vital Signs (Most Recent):  Temp: 98 °F (36.7 °C) (08/02/18 1600)  Pulse: 77 (08/02/18 1600)  Resp: 18 (08/02/18 1600)  BP: 133/60 (08/02/18 1600)  SpO2: (!) 92 % (08/02/18 1600) Vital Signs (24h Range):  Temp:  [97 °F (36.1 °C)-98.5 °F (36.9 °C)] 98 °F (36.7 °C)  Pulse:  [] 77  Resp:  [12-18] 18  SpO2:  [88 %-100 %] 92 %  BP: (133-179)/(60-90) 133/60     Weight: 71.7 kg (158 lb 1.1 oz)  Height: 6' (182.9 cm)  Body mass index is 21.44 kg/m².      Intake/Output Summary (Last 24 hours) at 08/02/18 1735  Last data filed at 08/01/18 6082   Gross per 24 hour   Intake           1177.5 ml    Output                0 ml   Net           1177.5 ml       Ortho/SPM Exam    Significant Labs: All pertinent labs within the past 24 hours have been reviewed.  None    Significant Imaging: None    Assessment/Plan:     Active Diagnoses:    Diagnosis Date Noted POA    PRINCIPAL PROBLEM:  Multiple closed pelvic fractures with disruption of pelvic [place] [S32.810A] 07/31/2018 Yes    CATRACHO (acute kidney injury) [N17.9] 08/01/2018 Yes    Anemia of chronic disease [D63.8] 08/01/2018 Yes    Coronary artery disease involving native coronary artery of native heart without angina pectoris [I25.10] 08/01/2018 Yes    Debility [R53.81] 08/01/2018 Yes    Closed compression fracture of third lumbar vertebra [S32.030A] 07/31/2018 Yes    Mixed hyperlipidemia [E78.2] 11/05/2013 Yes      Problems Resolved During this Admission:    Diagnosis Date Noted Date Resolved POA     S/P RIGHT MILDLY DISPLACED BICOLUMNAR ACETABULAR FX, INF PUBIC RAMUS FX, L3 COMPRESSION FX  - agree with Dr. Denisa guevara. Partial WB RLE if able, otherwise wheelchair with minimal WB if pain/stability limits. Follow up with me in 4 weeks for repeat xrays.      Kenney Sadler MD  Orthopedics  [facility]

## 2018-08-02 NOTE — PROGRESS NOTES
[facility] Hospital Medicine  Progress Note    Patient Name: Tushar Ybarra  MRN: [id number]  Patient Class: OP- Observation   Admission Date: 7/31/2018  Length of Stay: 0 days  Attending Physician: Wendy Ram MD  Primary Care Provider: Dutch Umana MD        Subjective:     Principal Problem:Multiple closed pelvic fractures with disruption of pelvic [place]    HPI:  The patient is a 89 y.o. male, with history of arthritis and CAD, who presents s/p fall. Patient reports he was ambulating when he turned and fell. He is unsure if he hit his head on the floor, but denies LOC. He reports chronic back pain, bilateral hip pain, generalized body aches, and wound to right arm. He denies neck pain, shoulder pain, elbow pain, wrist pain, or headache. He reports history of back surgery.    Upon CT imaging, patient is found to have multiple pelvic fractures and a worsening compression fracture at L3.  Patient will be admitted for ortho consultation and pain management.    Hospital Course:  The patient had a CT of the spine done that revealed acute on chronic moderate to severe compression fracture of the L3 vertebral body which has progressed compared to previous MRI from 02/23/2018.  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  Stable remote severe L1 vertebral body compression fracture.  Stable remote mild-to-moderate T7 vertebral body compression fracture.  CT of the pelvis was also done which showed complex, mildly displaced fracture of the right acetabulum involving the anterior and posterior columns and medial wall with extension along the superior pubic ramus.  Minimally displaced fracture of the right inferior pubic ramus.  Compression fracture of the L3 vertebral body, progressed when compared to the previous exam noting retropulsion of the posterior-superior corner that contributes to severe spinal canal stenosis.   Moderate bilateral femoroacetabular osteoarthritis.  The patient was seen by Orthopedic surgery and is not a surgical candidate.  PT/OT/Speech consulted and the patient should be non-weight bearing with toe touch only for 6 weeks.  The patient was transitioned from IV pain medication to oral with current titration for desired effect.  Speech recommends a modified diet (soft with chopped meats) and assistance with all meals.    Interval History: The patient has some excess somnolence and confusion with Norco, so will try to maximize pain control with Tramadol.  The patient currently has poor po intake.      Review of Systems   Unable to perform ROS: Acuity of condition     Objective:     Vital Signs (Most Recent):  Temp: 97.9 °F (36.6 °C) (08/02/18 1225)  Pulse: 85 (08/02/18 1225)  Resp: 16 (08/02/18 1225)  BP: (!) 154/69 (08/02/18 1225)  SpO2: (!) 88 % (08/02/18 1225) Vital Signs (24h Range):  Temp:  [97 °F (36.1 °C)-98.5 °F (36.9 °C)] 97.9 °F (36.6 °C)  Pulse:  [] 85  Resp:  [12-18] 16  SpO2:  [88 %-100 %] 88 %  BP: (142-179)/(61-90) 154/69     Weight: 71.7 kg (158 lb 1.1 oz)  Body mass index is 21.44 kg/m².    Intake/Output Summary (Last 24 hours) at 08/02/18 1229  Last data filed at 08/01/18 2326   Gross per 24 hour   Intake           1537.5 ml   Output              100 ml   Net           1437.5 ml      Physical Exam   Constitutional: He is oriented to person, place, and time. He appears well-developed. He appears ill.   Hypersomnolent elderly male this am   HENT:   Head: Normocephalic.   Eyes: Conjunctivae are normal.   Neck: Normal range of motion. Neck supple.   Cardiovascular: Regular rhythm, normal heart sounds and intact distal pulses.  Tachycardia present.    Pulses:       Radial pulses are 2+ on the right side, and 2+ on the left side.        Dorsalis pedis pulses are 1+ on the right side, and 1+ on the left side.   Pulmonary/Chest: Effort normal. He has decreased breath sounds in the right lower  field and the left lower field.   Abdominal: Soft. He exhibits no distension. Bowel sounds are increased. There is no tenderness.   Musculoskeletal: Normal range of motion.        Lumbar back: He exhibits tenderness and pain.   Neurological: He is alert and oriented to person, place, and time. GCS eye subscore is 4. GCS verbal subscore is 5. GCS motor subscore is 6.   Skin: Skin is warm and dry. There is pallor.   Psychiatric: He has a normal mood and affect. His speech is normal and behavior is normal.       Significant Labs:   CBC:   Recent Labs  Lab 07/31/18 2136 08/01/18 0428 08/02/18 0439   WBC 17.51* 13.18* 10.33   HGB 11.3* 10.2* 9.6*   HCT 34.3* 31.2* 29.5*    153 135*     CMP:   Recent Labs  Lab 07/31/18 2136 08/01/18 0428 08/02/18 0439    135* 137   K 4.6 4.0 4.2    106 110   CO2 20* 21* 18*   GLU 97 127* 111*   BUN 32* 31* 23   CREATININE 1.5* 1.3 1.1   CALCIUM 9.4 8.7 8.9   PROT  --  6.5 6.1   ALBUMIN  --  3.6 3.1*   BILITOT  --  0.9 0.9   ALKPHOS  --  72 57   AST  --  31 32   ALT  --  19 17   ANIONGAP 11 8 9   EGFRNONAA 39* 46* 56*     Magnesium:   Recent Labs  Lab 08/01/18 0428 08/02/18  0439   MG 1.8 1.9       Significant Imaging: I have reviewed and interpreted all pertinent imaging results/findings within the past 24 hours.    Assessment/Plan:      * Multiple closed pelvic fractures with disruption of pelvic [place]    Ortho consult noted  Adjust Tramadol for adequate pain control  Continue PT/OT  Discharge planning in progress                Closed compression fracture of third lumbar vertebra    Still attempting to achieve adequate pain control  PT/OT        CATRACHO (acute kidney injury)    Continues to improve with gentle hydration  Continue IV fluids of NS at 50/hr  Follow electrolytes  PO intake remains poor        Coronary artery disease involving native coronary artery of native heart without angina pectoris    Stable          Mixed hyperlipidemia    Continue  pravastatin            VTE Risk Mitigation         Ordered     Place RENÉE hose  Until discontinued      08/01/18 0144     Place sequential compression device  Until discontinued      08/01/18 0144     IP VTE HIGH RISK PATIENT  Once      08/01/18 0144     Reason for No Pharmacological VTE Prophylaxis  Once      08/01/18 0144     Place RENÉE hose  Until discontinued      08/01/18 0144     Place sequential compression device  Until discontinued      08/01/18 0144              Wendy Ram MD  Department of Hospital Medicine   [facility]

## 2018-08-02 NOTE — PT/OT/SLP EVAL
Speech Language Pathology Evaluation  Bedside Swallow    Patient Name:  Tushar Ybarra   MRN:  [id number]  Admitting Diagnosis: Multiple closed pelvic fractures with disruption of pelvic [place]    Recommendations:                 General Recommendations:     1. Speech therapy to follow up 4-6x per week for ongoing assessment and remediation of dysphagia and cognitive communication status    2. Pt requires 1:1 assistance with meals     Diet recommendations:  Mechanical soft, Finely chopped meat, Thin (3-5 mls via spoon with aspiration precautions)     Aspiration Precautions:   1. 1 bite/sip at a time,   2. Assistance with meals,   3. Avoid talking while eating,   4. Double swallow with each bite/sip,   5. Eliminate distractions,   6. Feed only when awake/alert,   7. Meds crushed in puree,   8. Monitor for s/s of aspiration,   9. No straws,   10. Remain upright 30 minutes post meal,   11. Small bites/sips (thin liquids provided as 3-5 mls via spoon)  12. Standard aspiration precautions     General Precautions: Standard, aspiration      History:     PER HPI: Tushar Ybarra is a 89 y.o. male admitted with a medical diagnosis of Multiple closed pelvic fractures with disruption of pelvic [place]. Medical history is significant for arthritis and CAD, who presents s/p fall. Patient reports he was ambulating when he turned and fell. He is unsure if he hit his head on the floor, but denies LOC. He reports chronic back pain, bilateral hip pain, generalized body aches, and wound to right arm. He denies neck pain, shoulder pain, elbow pain, wrist pain, or headache. He reports history of back surgery.    Past Medical History:   Diagnosis Date    Arthritis     BPH (benign prostatic hyperplasia)     CAD (coronary artery disease)     Edema     Falls     GERD (gastroesophageal reflux disease)     Glaucoma     HLD (hyperlipidemia)     Macular degeneration     Neuropathy     Seasonal allergies     Skin cancer      Vitamin B 12 deficiency     Vitamin D deficiency        Past Surgical History:   Procedure Laterality Date    BACK SURGERY             Subjective     · Pt in bed, semi-alert with eyes closed, upon initiation of session. Daughter at bedside reports increased confusion follow admission with inconsistent coughing with bites and sips. She also reports increased confusional state with pain medication. Nurses report pt was demonstrating consistent coughing with thin liquids so diet was adjusted to nectar thickened liquids while awaiting SLP evaluation. Reportedly, pt does not typically demonstrate coughing/choking with meals, unless he is talking throughout the meal, as per his daughter. Upon alerting, pt was agreeable to session.     Objective:     COGNITIVE COMMUNICATION: Pt was semi-alert with eyes closed upon initiation of session, required mod cuing to increase alertness. Pt noted with confusional state, oriented to name, but not oriented to place, date, or reason for hospitalization. Daughter reports that this is not baseline. Pt required max cues to sustain attention for 2-3 minutes with 2x instances of falling asleep. Pt required mod cues to follow 1 step commands  with approx 50% acc. Pt was able to participate in 2-3 turns of a conversation before he required redirection and cues to increase alertness and attention. Verbal expression is fluent and speech intelligibility is 100% at the conversational level; however, pt is noted with confused language. He is unable to independently communicate wants and needs at this time, and is noted with decreased safety awareness.     Oral Musculature Evaluation  · Oral Musculature: general weakness consistent with age   · Dentition: present and adequate  · Secretion Management: adequate  · Mucosal Quality: adequate  · Oral Labial Strength and Mobility: WFL  · Lingual Strength and Mobility: other (see comments) (WFL for functional speech and feeding tasks; pt unable to  follow 1 step oral motor commands at time of evaluation)  · Voice Prior to PO Intake: Clear, low volume  · Oral Musculature Comments: Face is symmetrical at rest and during smile. Lingual strength and ROM asssesed as WFL during functional speech and feeding tasks, as pt was unable to reliably follow oral motor commands. Lip seal noted as WFL with no anterior loss of secretions or bolus. Vocal quality clear with low volume prior to PO intake. Speech is 100% intelligible at the conversational level; however, pt is noted with confused language at this time.     Bedside Swallow Eval:   Consistencies Assessed:  · Thin liquids 3-5 mls  · Puree 1 tsp amounts of pudding  · Soft solids mechanical soft breakfast plate   · RN administered whole and crushed medication in 1 tsp amount of pudding      Oral Phase:   · Lip seal noted as WFL for all consistencies.   · Pt demonstrated decreased ability to form a cohesive bolus with puree and soft solids c/b minimal oral residuals after the swallow. These were easily remediated by liquid wash and second swallow.  · Pt demonstrated prolonged bolus prep with puree and soft solids.   · Pt attempted mastication of whole pill, thus it was recommended pills be administered in pudding at this time and crushed when appropriate.     Pharyngeal Phase:   · Trigger of swallow appears delayed; however, noted as WFL per pt's age.   · No overt s/s of aspiration or airway threat with purees of soft solids.   · Overt signs of airway threat/aspiration noted in approx 50% of thin liquid trials: throat clear after the swallow, coughing, audible swallows. Pt cued to demonstrated second swallow prior to speaking in 2x trials, resulting in no overt s/s of airway threat.   · Pt noted with worsening symptoms of aspiration/airway threat towards end of session and with decreased alertness.     Compensatory Strategies  · SLP provided education to RNs and daughter of safe swallow strategies. SLP recommended pt  consume 3-5 mls of thin liquids via spoon while pt awake and alert ONLY and continue on mechanical soft diet with chopped meats. SLP recommended limiting volume and utilizing safe swallow strategies vs thickening liquids, secondary to pt's confusional state and decreased alertness. Daughter demonstrated verbal understanding.         Assessment:     Tushar Ybarra is a 89 y.o. male with an SLP diagnosis of dysphagia c/b inconsistent coughing on thin liquids. SLP recommended limiting volume of thin liquids to 3-5 mls via spoon, utilizing aspiration precautions, and feeding only when awake/alert, as opposed to thickening liquids at this time, while pt continues to demonstrate confusional state. Pt will require 1:1 assistance with meals due to reduced safety awareness and confusional state. Speech therapy will continue to monitor diet tolerance and follow up for ongoing assessment and remediation of dysphagia and cognitive communication status.     Goals:    SLP Goals        Problem: SLP Goal    Goal Priority Disciplines Outcome   SLP Goal     SLP Ongoing (interventions implemented as appropriate)   Description:  1. Pt will be able to consume mechanical soft diet with thin liquids without signs of airway threat or aspiration given moderate assistance.   2. Pt will follow through with safe swallowing strategies/aspiration precautions with 100% acc given moderate assistance.  3. Ongoing assessment of dysphagia and cognitive communication status.                     Plan:     · Patient to be seen:  4 x/week, 6 x/week   · Plan of Care expires:  08/10/18  · Plan of Care reviewed with:  patient, daughter, other (see comments) (RN)   · SLP Follow-Up:  Yes       Discharge recommendations:  other (see comments) (TBD)       Time Tracking:     SLP Treatment Date:   08/02/18  Speech Start Time:  0910  Speech Stop Time:  0945     Speech Total Time (min):  35 min    Billable Minutes: Eval Swallow and Oral Function 35  gwendolyn Savage, CCC-SLP  08/02/2018

## 2018-08-02 NOTE — SUBJECTIVE & OBJECTIVE
Interval History: The patient has some excess somnolence and confusion with Norco, so will try to maximize pain control with Tramadol.  The patient currently has poor po intake.      Review of Systems   Unable to perform ROS: Acuity of condition     Objective:     Vital Signs (Most Recent):  Temp: 97.9 °F (36.6 °C) (08/02/18 1225)  Pulse: 85 (08/02/18 1225)  Resp: 16 (08/02/18 1225)  BP: (!) 154/69 (08/02/18 1225)  SpO2: (!) 88 % (08/02/18 1225) Vital Signs (24h Range):  Temp:  [97 °F (36.1 °C)-98.5 °F (36.9 °C)] 97.9 °F (36.6 °C)  Pulse:  [] 85  Resp:  [12-18] 16  SpO2:  [88 %-100 %] 88 %  BP: (142-179)/(61-90) 154/69     Weight: 71.7 kg (158 lb 1.1 oz)  Body mass index is 21.44 kg/m².    Intake/Output Summary (Last 24 hours) at 08/02/18 1229  Last data filed at 08/01/18 2326   Gross per 24 hour   Intake           1537.5 ml   Output              100 ml   Net           1437.5 ml      Physical Exam   Constitutional: He is oriented to person, place, and time. He appears well-developed. He appears ill.   Hypersomnolent elderly male this am   HENT:   Head: Normocephalic.   Eyes: Conjunctivae are normal.   Neck: Normal range of motion. Neck supple.   Cardiovascular: Regular rhythm, normal heart sounds and intact distal pulses.  Tachycardia present.    Pulses:       Radial pulses are 2+ on the right side, and 2+ on the left side.        Dorsalis pedis pulses are 1+ on the right side, and 1+ on the left side.   Pulmonary/Chest: Effort normal. He has decreased breath sounds in the right lower field and the left lower field.   Abdominal: Soft. He exhibits no distension. Bowel sounds are increased. There is no tenderness.   Musculoskeletal: Normal range of motion.        Lumbar back: He exhibits tenderness and pain.   Neurological: He is alert and oriented to person, place, and time. GCS eye subscore is 4. GCS verbal subscore is 5. GCS motor subscore is 6.   Skin: Skin is warm and dry. There is pallor.   Psychiatric: He  has a normal mood and affect. His speech is normal and behavior is normal.       Significant Labs:   CBC:   Recent Labs  Lab 07/31/18 2136 08/01/18 0428 08/02/18 0439   WBC 17.51* 13.18* 10.33   HGB 11.3* 10.2* 9.6*   HCT 34.3* 31.2* 29.5*    153 135*     CMP:   Recent Labs  Lab 07/31/18 2136 08/01/18 0428 08/02/18 0439    135* 137   K 4.6 4.0 4.2    106 110   CO2 20* 21* 18*   GLU 97 127* 111*   BUN 32* 31* 23   CREATININE 1.5* 1.3 1.1   CALCIUM 9.4 8.7 8.9   PROT  --  6.5 6.1   ALBUMIN  --  3.6 3.1*   BILITOT  --  0.9 0.9   ALKPHOS  --  72 57   AST  --  31 32   ALT  --  19 17   ANIONGAP 11 8 9   EGFRNONAA 39* 46* 56*     Magnesium:   Recent Labs  Lab 08/01/18 0428 08/02/18 0439   MG 1.8 1.9       Significant Imaging: I have reviewed and interpreted all pertinent imaging results/findings within the past 24 hours.

## 2018-08-02 NOTE — ASSESSMENT & PLAN NOTE
Continues to improve with gentle hydration  Continue IV fluids of NS at 50/hr  Follow electrolytes  PO intake remains poor

## 2018-08-03 VITALS
HEIGHT: 72 IN | SYSTOLIC BLOOD PRESSURE: 114 MMHG | BODY MASS INDEX: 21.41 KG/M2 | RESPIRATION RATE: 16 BRPM | TEMPERATURE: 99 F | OXYGEN SATURATION: 92 % | HEART RATE: 80 BPM | WEIGHT: 158.06 LBS | DIASTOLIC BLOOD PRESSURE: 56 MMHG

## 2018-08-03 LAB
ALBUMIN SERPL BCP-MCNC: 2.9 G/DL
ALP SERPL-CCNC: 55 U/L
ALT SERPL W/O P-5'-P-CCNC: 11 U/L
ANION GAP SERPL CALC-SCNC: 10 MMOL/L
AST SERPL-CCNC: 27 U/L
BASOPHILS # BLD AUTO: 0.04 K/UL
BASOPHILS NFR BLD: 0.3 %
BILIRUB SERPL-MCNC: 0.8 MG/DL
BUN SERPL-MCNC: 21 MG/DL
CALCIUM SERPL-MCNC: 8.3 MG/DL
CHLORIDE SERPL-SCNC: 112 MMOL/L
CO2 SERPL-SCNC: 18 MMOL/L
CREAT SERPL-MCNC: 1 MG/DL
DIFFERENTIAL METHOD: ABNORMAL
EOSINOPHIL # BLD AUTO: 0.4 K/UL
EOSINOPHIL NFR BLD: 3.5 %
ERYTHROCYTE [DISTWIDTH] IN BLOOD BY AUTOMATED COUNT: 13.9 %
EST. GFR  (AFRICAN AMERICAN): >60 ML/MIN/1.73 M^2
EST. GFR  (NON AFRICAN AMERICAN): >60 ML/MIN/1.73 M^2
GLUCOSE SERPL-MCNC: 103 MG/DL
HCT VFR BLD AUTO: 27.8 %
HGB BLD-MCNC: 9 G/DL
LYMPHOCYTES # BLD AUTO: 2.1 K/UL
LYMPHOCYTES NFR BLD: 17.5 %
MAGNESIUM SERPL-MCNC: 1.7 MG/DL
MCH RBC QN AUTO: 30.6 PG
MCHC RBC AUTO-ENTMCNC: 32.4 G/DL
MCV RBC AUTO: 95 FL
MONOCYTES # BLD AUTO: 1.2 K/UL
MONOCYTES NFR BLD: 10.2 %
NEUTROPHILS # BLD AUTO: 8.1 K/UL
NEUTROPHILS NFR BLD: 68.2 %
PHOSPHATE SERPL-MCNC: 2.6 MG/DL
PLATELET # BLD AUTO: 119 K/UL
PMV BLD AUTO: 9.9 FL
POTASSIUM SERPL-SCNC: 3.7 MMOL/L
PROT SERPL-MCNC: 5.7 G/DL
RBC # BLD AUTO: 2.94 M/UL
SODIUM SERPL-SCNC: 140 MMOL/L
WBC # BLD AUTO: 11.8 K/UL

## 2018-08-03 PROCEDURE — G8996 SWALLOW CURRENT STATUS: HCPCS | Mod: CL

## 2018-08-03 PROCEDURE — 84100 ASSAY OF PHOSPHORUS: CPT

## 2018-08-03 PROCEDURE — 25000003 PHARM REV CODE 250: Performed by: NURSE PRACTITIONER

## 2018-08-03 PROCEDURE — 83735 ASSAY OF MAGNESIUM: CPT

## 2018-08-03 PROCEDURE — G8988 SELF CARE GOAL STATUS: HCPCS | Mod: CL

## 2018-08-03 PROCEDURE — 92526 ORAL FUNCTION THERAPY: CPT

## 2018-08-03 PROCEDURE — 25000003 PHARM REV CODE 250: Performed by: HOSPITALIST

## 2018-08-03 PROCEDURE — G8980 MOBILITY D/C STATUS: HCPCS | Mod: CM

## 2018-08-03 PROCEDURE — 97535 SELF CARE MNGMENT TRAINING: CPT

## 2018-08-03 PROCEDURE — 80053 COMPREHEN METABOLIC PANEL: CPT

## 2018-08-03 PROCEDURE — G8989 SELF CARE D/C STATUS: HCPCS | Mod: CL

## 2018-08-03 PROCEDURE — 97530 THERAPEUTIC ACTIVITIES: CPT | Mod: 59

## 2018-08-03 PROCEDURE — G8997 SWALLOW GOAL STATUS: HCPCS | Mod: CJ

## 2018-08-03 PROCEDURE — 99217 PR OBSERVATION CARE DISCHARGE: CPT | Mod: ,,, | Performed by: HOSPITALIST

## 2018-08-03 PROCEDURE — 36415 COLL VENOUS BLD VENIPUNCTURE: CPT

## 2018-08-03 PROCEDURE — G8998 SWALLOW D/C STATUS: HCPCS | Mod: CL

## 2018-08-03 PROCEDURE — 85025 COMPLETE CBC W/AUTO DIFF WBC: CPT

## 2018-08-03 PROCEDURE — G0378 HOSPITAL OBSERVATION PER HR: HCPCS

## 2018-08-03 PROCEDURE — G8979 MOBILITY GOAL STATUS: HCPCS | Mod: CK

## 2018-08-03 RX ORDER — ASCORBIC ACID 500 MG
500 TABLET ORAL DAILY
COMMUNITY
Start: 2018-08-03

## 2018-08-03 RX ORDER — ACETAMINOPHEN 500 MG
500 TABLET ORAL EVERY 6 HOURS PRN
Refills: 0 | COMMUNITY
Start: 2018-08-03

## 2018-08-03 RX ORDER — AMOXICILLIN 250 MG
1 CAPSULE ORAL EVERY OTHER DAY
COMMUNITY
Start: 2018-08-03

## 2018-08-03 RX ORDER — FERROUS SULFATE, DRIED 160(50) MG
1 TABLET, EXTENDED RELEASE ORAL 2 TIMES DAILY WITH MEALS
COMMUNITY
Start: 2018-08-03

## 2018-08-03 RX ORDER — HYDROCODONE BITARTRATE AND ACETAMINOPHEN 5; 325 MG/1; MG/1
1 TABLET ORAL EVERY 12 HOURS PRN
Qty: 30 TABLET | Refills: 0 | Status: SHIPPED | OUTPATIENT
Start: 2018-08-03

## 2018-08-03 RX ADMIN — PRAVASTATIN SODIUM 40 MG: 20 TABLET ORAL at 08:08

## 2018-08-03 RX ADMIN — STANDARDIZED SENNA CONCENTRATE AND DOCUSATE SODIUM 1 TABLET: 8.6; 5 TABLET, FILM COATED ORAL at 08:08

## 2018-08-03 RX ADMIN — DORZOLAMIDE HYDROCHLORIDE 1 DROP: 20 SOLUTION/ DROPS OPHTHALMIC at 09:08

## 2018-08-03 RX ADMIN — CALCIUM CARBONATE-VITAMIN D TAB 500 MG-200 UNIT 1 TABLET: 500-200 TAB at 09:08

## 2018-08-03 RX ADMIN — HYDROCODONE BITARTRATE AND ACETAMINOPHEN 1 TABLET: 5; 325 TABLET ORAL at 09:08

## 2018-08-03 RX ADMIN — FAMOTIDINE 20 MG: 20 TABLET ORAL at 08:08

## 2018-08-03 RX ADMIN — Medication 500 MG: at 08:08

## 2018-08-03 RX ADMIN — BETAXOLOL HYDROCHLORIDE 1 DROP: 2.8 SUSPENSION/ DROPS OPHTHALMIC at 09:08

## 2018-08-03 RX ADMIN — PREGABALIN 25 MG: 25 CAPSULE ORAL at 09:08

## 2018-08-03 RX ADMIN — DULOXETINE HYDROCHLORIDE 20 MG: 20 CAPSULE, DELAYED RELEASE ORAL at 09:08

## 2018-08-03 RX ADMIN — FINASTERIDE 5 MG: 5 TABLET, FILM COATED ORAL at 09:08

## 2018-08-03 RX ADMIN — ALFUZOSIN HYDROCHLORIDE 10 MG: 10 TABLET ORAL at 09:08

## 2018-08-03 NOTE — PT/OT/SLP PROGRESS
Speech Language Pathology Treatment    Patient Name:  Tushar Ybarra   MRN:  [id number]  Admitting Diagnosis: Multiple closed pelvic fractures with disruption of pelvic [place]    Recommendations:                General Recommendations:                1. Speech therapy to follow up 4-6x per week for ongoing assessment and remediation of dysphagia and cognitive communication status               2. Pt requires 1:1 assistance with meals    3. MBSS to objectively assess OVERT s/s of dysphagia       Diet recommendations:  Pureed solids, Thin (3-5 mls via spoon with aspiration precautions)     Aspiration Precautions:   1. 1 bite/sip at a time,   2. Assistance with meals,   3. Avoid talking while eating,   4. Double swallow with each bite/sip,   5. Eliminate distractions,   6. Feed only when awake/alert,   7. Meds crushed in puree,   8. Monitor for s/s of aspiration,   9. No straws,   10. Remain upright 30 minutes post meal,   11. Small bites/sips (thin liquids provided as 3-5 mls via spoon)  12. Standard aspiration precautions      General Precautions: Standard, aspiration, pureed diet    Subjective     · Pt seen BID this date for treatment of dysphagia and for family education. Pt sitting in chair, finishing breakfast with daughter. Pt demonstrating overt s/s of airway threat and aspiration when SLP entered room. Daughter stated he just started coughing and was not coughing before. Pt's eyes were watering and his vocal quality was wet. SLP planned to watch pt eat lunch, however, pt is planning for discharge before lunch. SLP went back to educate daughter on overt signs and symptoms of aspiration and recommend MBS to determine safest diet.     Objective:     Has the patient been evaluated by SLP for swallowing?   Yes  Keep patient NPO? No   Current Respiratory Status: nasal cannula      COGNITIVE COMMUNICATION: Pt continues to demonstrate increased confusion. Pt required max assistance to sustain attention for 5 min  "feeding task. Pt able to follow 1 step commands related to feeding task with max cues >50% acc. Pt noted with increased distractibility and confused language throughout session. Pt continued to ask "what is a swallow" and acted as if he was feeding himself, however, was not picking up any food to put in his mouth. Required max assistance to follow through on all safe swallow recommendations/aspiration precautions. Pt continues to be reliant on communication partner to anticipate wants and needs.     SWALLOWING: Pt seen at the end of breakfast this morning. OVERT s/s of airway threat and aspiration noted on all bites of all consistencies (thin liquids and purees) this date. Pt coughed before and after the swallow and demonstrated wet vocal quality after the swallow on 100% of trials of purees and thin liquids. Pt also noted to have eye watering before SLP entered room and for entire 18 min session. Pt was unable to follow through on safe swallow strategies (do not talk while eating, swallow twice) despite max verbal prompts and cueing. Daughter gave pt thin liquids through a straw and SLP reminded daughter that straws are not safe for the pt at this time and thin liquids should only be given in small amounts. Daughter reported did well with dinner last night, however, he has not eaten this much food in a couple of days. SLP stated the pt did not look safe to continue eating at the present time. Pt's daughter stated he had not had any problems until the SLP walked in the room. SLP planned to see pt eat lunch, however pt plans do d/c before lunch.     EDUCATION: Daughter educated on aspiration precautions and safe swallowing strategies: decrease talking while eating, HOB to 90 degrees, second swallow with each bite, small bites and sips, 1:1 assistance with meals, feed only when alert. SLP spoke with daughter about overt s/s of aspiration this date. Daughter asked about upgrading pt's diet before leaving the hospital, " however, it is NOT recommended that the pt upgrade to mechanical soft or regular diet at this time. The pt's daughter stated that her father is happiest when he is eating and may not tolerate pureed foods for much longer. SLP explained that the pt does not look safe to upgrade his diet at this time and due to overt s/s of airway threat and aspiration, a Modified Barium Swallow Study is recommended to objectively assess the degree and cause of aspiration. SLP spoke with the pt's two daughters in person and on the phone. One of the pt's daughter is a Speech-Language Pathologist and is worried if her father gets a modified barium swallow study (MBSS) it will be recommended that he receive a feeding tube. The SLP explained that a MBSS is the only way to see exactly where the food is going and what strategies can be implemented to keep her father safe while eating. Both daughters demonstrated understanding, however, they would like to watch him eat for a couple more days while his pain medication wears off before scheduling the swallow study. The SLP informed them that since the pt is not following through with safe swallow strategies he will continue to need someone to assist him with every meal. SLP also recommended that their father be evaluated by a speech pathologist at the next level of care to make the decision on whether he is safe to continue eating. The pt's daughter stated she knows there are SLP's in his nursing facility and she will ask for her father to be seen by one. SLP informed the pt's daughter that if the pt were going to remain in the hospital, the SLP would recommend he receive a MBSS before making any changes to his diet at this time. The pt's daughter said she felt comfortable with him continuing to trial his current diet while weaning from pain medications and will talk to the nursing home facility therapist about what to do next. The SLP told the pt's daughter that he can have a MBSS done at  [facility] as an outpatient procedure. The pt's daughter was educated on the steps of a modified and what her father would have to do to participate in the study. She verbalized understanding of all discussed.     Assessment:     Tushar Ybarra is a 89 y.o. male with an SLP diagnosis of dysphagia and decreased safety awareness due to cognitive communication status. SLP recommended downgrade of mechanical soft diet to pureed diet with thin liquids pending improvement in cognition. It is recommended that speech therapy continue at next level of care for ongoing assessment and remediation of dysphagia and cognitive communication status. Pt presented with OVERT s/s of airway threat and aspiration this date (coughing, wet vocal quality, and eye watering) during oral intake of thin liquids and purees. The pt's daughters were educated on the risks of aspiration and it was recommended that the pt participate in a MBSS to objectively assess degree and cause of pharyngeal dysphagia. Pt's daughter's agreed to consult SLP at next level of care and will consider scheduling their farther for a MBSS.     Goals:    SLP Goals        Problem: SLP Goal    Goal Priority Disciplines Outcome   SLP Goal     SLP Ongoing (interventions implemented as appropriate)   Description:  1. Pt will be able to consume mechanical soft diet with thin liquids without signs of airway threat or aspiration given moderate assistance.   2. Pt will follow through with safe swallowing strategies/aspiration precautions with 100% acc given moderate assistance.  3. Ongoing assessment of dysphagia and cognitive communication status.                     Plan:     · Patient to be seen:  4 x/week, 6 x/week   · Plan of Care expires:  08/10/18  · Plan of Care reviewed with:  daughter   · SLP Follow-Up:  Yes       Discharge recommendations: nursing facility    Time Tracking:     SLP Treatment Date:   08/03/18   Speech Start Time: 1110  Speech Stop Time:  1128  Speech Total Time (min): 18 min    Speech Start Time:  1205  Speech Stop Time:  1230     Speech Total Time (min):  25 min    Billable Minutes: Treatment Swallowing Dysfunction 43 minutes    JAMA Maza-SLP  08/03/2018

## 2018-08-03 NOTE — PLAN OF CARE
Problem: SLP Goal  Goal: SLP Goal  1. Pt will be able to consume mechanical soft diet with thin liquids without signs of airway threat or aspiration given moderate assistance.   2. Pt will follow through with safe swallowing strategies/aspiration precautions with 100% acc given moderate assistance.  3. Ongoing assessment of dysphagia and cognitive communication status.    Outcome: Ongoing (interventions implemented as appropriate)  Pt seen this diet for assessment of diet tolerance, treatment of dysphagia, and family education.     Comments: Seen prior to d/c this date for family education.

## 2018-08-03 NOTE — PT/OT/SLP DISCHARGE
Physical Therapy Discharge Summary    Name: Tushar Ybarra  MRN: [id number]   Principal Problem: Multiple closed pelvic fractures with disruption of pelvic [place]     Patient Discharged from acute Physical Therapy on 8/3/18.  Please refer to prior PT noted date on 8/3/18 for functional status.     Assessment:     Patient has not met goals.    Objective:     GOALS:    Physical Therapy Goals        Problem: Physical Therapy Goal    Goal Priority Disciplines Outcome Goal Variances Interventions   Physical Therapy Goal     PT/OT, PT Ongoing (interventions implemented as appropriate)     Description:  Goals to be met by: 8/15/18    Patient will increase functional independence with mobility by performin. Supine to sit with min A.  2. Sit to supine with min A.   3. Rolling R and L with min A using log roll technique.   4. Sitting at edge of bed >5 minutes with CGA. -- MET 8/3/18  5. PT will assess sit<>stand. -- MET 8/3/18  REVISED: Sit<>stand with CGA and appropriate assistive device.  6. Bed<>chair with min A and appropriate AD.   7. Assessment of gait.                        Reasons for Discontinuation of Therapy Services  Transfer to alternate level of care.      Plan:     Patient Discharged to: Return to [facility].    Alison Daigle, PT  8/3/2018

## 2018-08-03 NOTE — PLAN OF CARE
Problem: Physical Therapy Goal  Goal: Physical Therapy Goal  Goals to be met by: 8/15/18    Patient will increase functional independence with mobility by performin. Supine to sit with min A.  2. Sit to supine with min A.   3. Rolling R and L with min A using log roll technique.   4. Sitting at edge of bed >5 minutes with CGA. -- MET 8/3/18  5. PT will assess sit<>stand. -- MET 8/3/18  REVISED: Sit<>stand with CGA and appropriate assistive device.  6. Bed<>chair with min A and appropriate AD.   7. Assessment of gait.      Outcome: Ongoing (interventions implemented as appropriate)  Patient progressing well with PT including sit<>stand x 4 trials progressing from mod A to min A. Pt required total assist for stand pivot to chair. Of note, he has baseline impaired forward flexed posture so he does not achieve full upright standing. Good maintenance of R LE PWB with sit<>stand trials, although more difficulty with transfer to chair.

## 2018-08-03 NOTE — DISCHARGE SUMMARY
[facility] Hospital Medicine  Discharge Summary      Patient Name: Tushar Ybarra  MRN: [id number]  Admission Date: 7/31/2018  Hospital Length of Stay: 0 days  Discharge Date and Time:  08/03/2018 1:03 PM  Attending Physician: Wendy Ram MD   Discharging Provider: Wendy Ram MD  Primary Care Provider: Dutch Umana MD      HPI:   The patient is a 89 y.o. male, with history of arthritis and CAD, who presents s/p fall. Patient reports he was ambulating when he turned and fell. He is unsure if he hit his head on the floor, but denies LOC. He reports chronic back pain, bilateral hip pain, generalized body aches, and wound to right arm. He denies neck pain, shoulder pain, elbow pain, wrist pain, or headache. He reports history of back surgery.    Upon CT imaging, patient is found to have multiple pelvic fractures and a worsening compression fracture at L3.  Patient will be admitted for ortho consultation and pain management.    * No surgery found *      Hospital Course:   The patient had a CT of the spine done that revealed acute on chronic moderate to severe compression fracture of the L3 vertebral body which has progressed compared to previous MRI from 02/23/2018.  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  Stable remote severe L1 vertebral body compression fracture.  Stable remote mild-to-moderate T7 vertebral body compression fracture.  CT of the pelvis was also done which showed complex, mildly displaced fracture of the right acetabulum involving the anterior and posterior columns and medial wall with extension along the superior pubic ramus.  Minimally displaced fracture of the right inferior pubic ramus.  Compression fracture of the L3 vertebral body, progressed when compared to the previous exam noting retropulsion of the posterior-superior corner that contributes to severe spinal canal stenosis.  Moderate  bilateral femoroacetabular osteoarthritis.  The patient was seen by Orthopedic surgery and is not a surgical candidate.  They recommend weight bearing of the right lower extremity only.  PT/OT/Speech consulted and the patient should be non-weight bearing on the left with toe touch only for 6 weeks.  The patient was transitioned from IV pain medication to oral with current titration for desired effect.  Speech recommends a modified diet (soft with chopped meats) and assistance with all meals.  He should be given Tylenol preferentially for pain control and Norco 5mg only for severe pain.  The patient will return to NH care with therapy to continue.     Consults:   Consults         Status Ordering Provider     Inpatient consult to Orthopedic Surgery  Once     Provider:  Nam Crawley MD    Acknowledged MICHEL BRIAN          No new Assessment & Plan notes have been filed under this hospital service since the last note was generated.  Service: Hospital Medicine    Final Active Diagnoses:    Diagnosis Date Noted POA    PRINCIPAL PROBLEM:  Multiple closed pelvic fractures with disruption of pelvic [place] [S32.810A] 07/31/2018 Yes    Closed compression fracture of third lumbar vertebra [S32.030A] 07/31/2018 Yes    CATRACHO (acute kidney injury) [N17.9] 08/01/2018 Yes    Anemia of chronic disease [D63.8] 08/01/2018 Yes    Coronary artery disease involving native coronary artery of native heart without angina pectoris [I25.10] 08/01/2018 Yes    Debility [R53.81] 08/01/2018 Yes    Mixed hyperlipidemia [E78.2] 11/05/2013 Yes      Problems Resolved During this Admission:    Diagnosis Date Noted Date Resolved POA       Discharged Condition: good    Disposition: Home or Self Care    Follow Up:  Follow-up Information     Dutch Umana MD In 1 week.    Specialties:  Geriatric Medicine, Hematology and Oncology, Internal Medicine  Contact information:  [address] [facility]  2ND FLOOR  [facility] MULTI-SPECIALTY  [facility]  [place] [address]  [phone]                 Patient Instructions:     Diet Adult Regular   Order Specific Question Answer Comments   Additional restrictions: Dental Soft      Activity as tolerated         Significant Diagnostic Studies: Labs:   CMP   Recent Labs  Lab 08/02/18 0439 08/03/18 0423    140   K 4.2 3.7    112*   CO2 18* 18*   * 103   BUN 23 21   CREATININE 1.1 1.0   CALCIUM 8.9 8.3*   PROT 6.1 5.7*   ALBUMIN 3.1* 2.9*   BILITOT 0.9 0.8   ALKPHOS 57 55   AST 32 27   ALT 17 11   ANIONGAP 9 10   ESTGFRAFRICA >60 >60   EGFRNONAA 56* >60    and CBC   Recent Labs  Lab 08/02/18 0439 08/03/18 0422   WBC 10.33 11.80   HGB 9.6* 9.0*   HCT 29.5* 27.8*   * 119*       Pending Diagnostic Studies:     None         Medications:  Reconciled Home Medications:      Medication List      START taking these medications    acetaminophen 500 MG tablet  Commonly known as:  TYLENOL  Take 1 tablet (500 mg total) by mouth every 6 (six) hours as needed for Pain.     HYDROcodone-acetaminophen 5-325 mg per tablet  Commonly known as:  NORCO  Take 1 tablet by mouth every 12 (twelve) hours as needed for Pain.        CONTINUE taking these medications    alfuzosin 10 mg Tb24  Commonly known as:  UROXATRAL  Take 10 mg by mouth daily with breakfast.     ascorbic acid (vitamin C) 500 MG tablet  Commonly known as:  VITAMIN C  Take 1 tablet (500 mg total) by mouth once daily.     ASPIR-81 ORAL  Take 1 tablet by mouth once daily.     BETOPTIC S 0.25 % Drps  Generic drug:  betaxolol  Place 1 drop into the left eye 2 (two) times daily.     calcium-vitamin D3 500 mg(1,250mg) -200 unit per tablet  Commonly known as:  CALCIUM 500 + D  Take 1 tablet by mouth 2 (two) times daily with meals.     CENTRUM 3,500-18-0.4 unit-mg-mg Chew  Generic drug:  multivit-iron-min-folic acid  Take by mouth.     dorzolamide 2 % ophthalmic solution  Commonly known as:  TRUSOPT  Place 1 drop into both eyes 3 (three) times daily.      DULoxetine 20 MG capsule  Commonly known as:  CYMBALTA  Take 20 mg by mouth once daily.     echothiophate 0.125 % ophthalmic solution  Place 1 drop into the left eye 2 (two) times daily.     FINASTERIDE ORAL  Take 5 mg by mouth once daily.     latanoprost 0.005 % ophthalmic solution  Place 1 drop into both eyes every evening.     LYRICA 25 MG capsule  Generic drug:  pregabalin  Take 25 mg by mouth once daily.     naproxen sodium 220 MG tablet  Commonly known as:  ANAPROX  Take 220 mg by mouth daily as needed.     PRAVASTATIN ORAL  Take 40 mg by mouth once daily.     ranitidine 150 MG capsule  Commonly known as:  ZANTAC  Take 150 mg by mouth 2 (two) times daily.     senna-docusate 8.6-50 mg 8.6-50 mg per tablet  Commonly known as:  SENNA WITH DOCUSATE SODIUM  Take 1 tablet by mouth every other day.            Indwelling Lines/Drains at time of discharge:   Lines/Drains/Airways          No matching active lines, drains, or airways          Time spent on the discharge of patient: 30 minutes  Patient was seen and examined on the date of discharge and determined to be suitable for discharge.         Wendy Ram MD  Department of Hospital Medicine  [facility]

## 2018-08-03 NOTE — PT/OT/SLP PROGRESS
Occupational Therapy   Treatment/Discharge    Name: Tushar Ybarra  MRN: [id number]  Admitting Diagnosis:  Multiple closed pelvic fractures with disruption of pelvic [place]       Recommendations:     Discharge Recommendations: nursing facility, skilled; patient returning to [facility] on this date  Discharge Equipment Recommendations:  other (see comments) (TBD, pending progress)  Barriers to discharge:  None    Subjective     Communicated with: RN prior to session.  Pain/Comfort:  · Pain Rating 1: 5/10  · Location - Side 1: Right  · Location - Orientation 1: generalized  · Location 1: other (see comments) (pelvis/hip)  · Pain Addressed 1: Pre-medicate for activity, Reposition, Distraction  · Pain Rating Post-Intervention 1: 5/10    Patients cultural, spiritual, Mandaeism conflicts given the current situation: None stated    Objective:     Patient found with: oxygen    General Precautions: Standard, fall, aspiration, pureed diet   Orthopedic Precautions:RLE partial weight bearing, spinal precautions   Braces: N/A     Occupational Performance:    Bed Mobility:    · Patient completed Sit to Supine with total assistance and 2 persons     Functional Mobility/Transfers:  · Patient completed Sit <> Stand Transfer with maximal assistance  with  hand-held assist   · Patient completed Bed <> Chair Transfer using Stand Pivot technique with maximal assistance and of 2 persons with hand-held assist  · Functional Mobility: NT    Activities of Daily Living:  · Lower Body Dressing: maximal assistance with AE for doffing/donning non-skid socks with AE    Patient left HOB elevated with all lines intact, call button in reach, bed alarm on, RN notified and RN present    [facility] 6 Click:  [facility] Total Score: 11    Treatment & Education:  Educated on use of AE for LBD.  Will require reinforcement, to see if patient is able to functionally use AE for LBD.  Education:    Assessment:     Tushar Ybarra is a 89 y.o. male with a  medical diagnosis of Multiple closed pelvic fractures with disruption of pelvic [place].  He presents with the following performance deficits affecting function: weakness, impaired endurance, impaired self care skills, impaired functional mobilty, gait instability, impaired balance, impaired cognition, decreased lower extremity function, pain, decreased ROM, impaired coordination, impaired skin, impaired cardiopulmonary response to activity, orthopedic precautions.  Improved level of alertness and engagement, though still with mod confusion remaining.  Attempted all tasks, though required constant direction for task performance.  Pain, as well as confusion, are present limiting factors.  Patient discharged after session, returned to [facility].  Highly recommend continued OT services, either through SNF or  services within NH.    Rehab Prognosis:  Fair +; patient would benefit from acute skilled OT services to address these deficits and reach maximum level of function.       Plan:     Patient to be seen 5 x/week to address the above listed problems via self-care/home management, therapeutic activities, therapeutic exercises  · Plan of Care Expires: 08/30/18  · Plan of Care Reviewed with: patient, daughter    This Plan of care has been discussed with the patient who was involved in its development and understands and is in agreement with the identified goals and treatment plan    GOALS:    Occupational Therapy Goals        Problem: Occupational Therapy Goal    Goal Priority Disciplines Outcome Interventions   Occupational Therapy Goal     OT, PT/OT Ongoing (interventions implemented as appropriate)    Description:  Goals to be met by: 08/16/18     Patient will increase functional independence with ADLs by performing:    UE Dressing with Moderate Assistance.  Grooming while EOB with Minimal Assistance.  Sitting at edge of bed x10 minutes with Contact Guard Assistance.  Rolling to Left with Moderate Assistance.    Supine to sit with Moderate Assistance.  Increased functional strength to 4/5 for bed mobility, ADL tasks and functional transfers.                      Time Tracking:     OT Date of Treatment: 08/03/18  OT Start Time: 1141  OT Stop Time: 1213  OT Total Time (min): 32 min    Billable Minutes:Self Care/Home Management 32    MAAME Cordero  8/3/2018

## 2018-08-03 NOTE — PT/OT/SLP PROGRESS
Physical Therapy Treatment    Patient Name:  Tushar Ybarra   MRN:  [id number]    Recommendations:     Discharge Recommendations:  nursing facility, skilled (If SNF is not an option, recommend home health PT/OT at patient's residence)   Discharge Equipment Recommendations:  (pending progress)   Barriers to discharge: Patient resides in [facility] with nursing care unit    Assessment:     Tushar Ybarra is a 89 y.o. male admitted with a medical diagnosis of Multiple closed pelvic fractures with disruption of pelvic [place].  He presents with the following impairments/functional limitations:  weakness, impaired endurance, pain, impaired self care skills, impaired functional mobilty, impaired cardiopulmonary response to activity, decreased lower extremity function, decreased ROM, orthopedic precautions, impaired cognition, decreased safety awareness, impaired coordination, impaired balance. Patient progressing well with PT including sit<>stand x 4 trials progressing from mod A to min A. Pt required total assist for stand pivot to chair. Of note, he has baseline impaired forward flexed posture so he does not achieve full upright standing. Good maintenance of R LE PWB with sit<>stand trials, although more difficulty with transfer to chair. Will continue to follow and progress as tolerated. Please see progress note for detailed plan of care and recommendations.      Rehab Prognosis:  Fair; patient would benefit from acute skilled PT services to address these deficits and reach maximum level of function.      Recent Surgery: * No surgery found *      Plan:     During this hospitalization, patient to be seen daily to address the above listed problems via gait training, therapeutic activities, therapeutic exercises, neuromuscular re-education, wheelchair management/training  · Plan of Care Expires:  08/31/18   Plan of Care Reviewed with: patient, daughter    Subjective     Communicated with nurse prior to session.   Patient found supine in bed with HOB elevated upon PT entry to room, agreeable to treatment.      Chief Complaint: pain  Patient comments/goals: agreeable to sit up in chair  Pain/Comfort:  · Pain Rating 1:  (grimacing with assisted movements, did not quantify)  · Location - Side 1: Left  · Location 1: thigh  · Pain Addressed 1: Pre-medicate for activity, Reposition, Distraction, Cessation of Activity  · Pain Rating Post-Intervention 1: 0/10 (at rest at end session)    Patients cultural, spiritual, Anabaptist conflicts given the current situation: none specified    Objective:     Patient found with: oxygen (nasal cannula found doffed at beginning session, SpO2 89%, PT reapplied immediately)     General Precautions: Standard,  (fall and aspiration)   Orthopedic Precautions:spinal precautions, RLE partial weight bearing     Functional Mobility:  Bed Mobility:     Supine to Sit: total assist with HOB elevated and verbal cues for spinal precautions  Sit to Supine: did not occur  Transfers:     Sit to Stand:  Mod A x 2 trials and min A x 2 trials with bilateral UE support on bed rails. Verbal cues for technique; assist to block L knee and foot, additional tactile and verbal cues for R LE PWB with use of PT foot under patient's foot to monitor weight bearing  Bed>chair with min A initially up into standing, then total assist for stand pivot, pt had difficulty with changing hand placement for proper UE support and sequencing despite verbal and tactile cues.       AM-PAC 6 CLICK MOBILITY  Turning over in bed (including adjusting bedclothes, sheets and blankets)?: 2  Sitting down on and standing up from a chair with arms (e.g., wheelchair, bedside commode, etc.): 2  Moving from lying on back to sitting on the side of the bed?: 2  Moving to and from a bed to a chair (including a wheelchair)?: 1  Need to walk in hospital room?: 1  Climbing 3-5 steps with a railing?: 1  Basic Mobility Total Score: 9       Therapeutic Activities  and Exercises:   -Pt sitting at edge of bed approximately 10 minutes with CGA for static sitting.   -Pt performed sitting therapeutic exercises bilaterally with active assist including hip flexion, long arc quads, ankle pumps x 10 reps with verbal and visual cues.  -Total assist for posterior scooting once in sitting.     Patient left up in chair with all lines intact, call button in reach, nurse notified and daughter present..    GOALS:    Physical Therapy Goals        Problem: Physical Therapy Goal    Goal Priority Disciplines Outcome Goal Variances Interventions   Physical Therapy Goal     PT/OT, PT Ongoing (interventions implemented as appropriate)     Description:  Goals to be met by: 8/15/18    Patient will increase functional independence with mobility by performin. Supine to sit with min A.  2. Sit to supine with min A.   3. Rolling R and L with min A using log roll technique.   4. Sitting at edge of bed >5 minutes with CGA. -- MET 8/3/18  5. PT will assess sit<>stand. -- MET 8/3/18  REVISED: Sit<>stand with CGA and appropriate assistive device.  6. Bed<>chair with min A and appropriate AD.   7. Assessment of gait.                        Time Tracking:     PT Received On: 18  PT Start Time: 0950     PT Stop Time: 1026  PT Total Time (min): 36 min     Billable Minutes: Therapeutic Activity 36    Treatment Type: Treatment  PT/PTA: PT     PTA Visit Number: 0     Alison Daigle, PT  2018

## 2018-08-03 NOTE — NURSING
VSS, turned Q2, up to chair, incontinence care provided, pain moderately well controlled, pt oriented only to self, arousable to voice and touch (seems fatigued), tolerating diet well, family at bedside, up with PT/OT, to be transferred back to [facility] this afternoon.  Removed telemetry, removed IV.    Called report to Myrtle HILL at [facility].

## 2018-08-03 NOTE — PLAN OF CARE
Problem: Patient Care Overview  Goal: Plan of Care Review  Outcome: Ongoing (interventions implemented as appropriate)  Patient kept free from falls and injuries. Daughter at bedside. Weight shift changed. Patient stable. Will continue to monitor.

## 2018-08-03 NOTE — PLAN OF CARE
Problem: Occupational Therapy Goal  Goal: Occupational Therapy Goal  Goals to be met by: 08/16/18     Patient will increase functional independence with ADLs by performing:    UE Dressing with Moderate Assistance.  Grooming while EOB with Minimal Assistance.  Sitting at edge of bed x10 minutes with Contact Guard Assistance.  Rolling to Left with Moderate Assistance.   Supine to sit with Moderate Assistance.  Increased functional strength to 4/5 for bed mobility, ADL tasks and functional transfers.     Outcome: Ongoing (interventions implemented as appropriate)  Improved level of alertness and engagement, though still with mod confusion remaining.  Attempted all tasks, though required constant direction for task performance.  Pain, as well as confusion, are present limiting factors.  Patient discharged after session, returned to [facility].  Highly recommend continued OT services, either through SNF or HH services within NH.    Comments: MAAME Cordero, 8/3/2018

## 2018-08-03 NOTE — PLAN OF CARE
[facility]  [address]  [place] [address]  [phone] after hours  [phone]  Fax      Facility Transfer Orders                        08/03/2018    Admit to: Nursing Home    Diagnoses:  Active Hospital Problems    Diagnosis  POA    *Multiple closed pelvic fractures with disruption of pelvic [place] [S32.810A]  Yes     Priority: 1 - High    Closed compression fracture of third lumbar vertebra [S32.030A]  Yes     Priority: 2     CATRACHO (acute kidney injury) [N17.9]  Yes     Priority: 3     Anemia of chronic disease [D63.8]  Yes     Priority: 4     Coronary artery disease involving native coronary artery of native heart without angina pectoris [I25.10]  Yes     Priority: 5     Debility [R53.81]  Yes     Priority: 6     Mixed hyperlipidemia [E78.2]  Yes      Resolved Hospital Problems    Diagnosis Date Resolved POA   No resolved problems to display.       Allergies:  Review of patient's allergies indicates:   Allergen Reactions    Tramadol Other (See Comments)     Intolerance       Vitals:  Once weekly    Diet:Dental soft with full assistance with all meals    Activity:    - Up in a chair each morning as tolerated  - Partial weight bearing of the right LE      Nursing Precautions:     - Aspiration precautions:             - Total assistance with meals            -  Upright 90 degrees befor during and after meals             -  Suction at bedside          - Fall precautions   - Seizure precaution   - Decubitus precautions:        -  for positioning   - Pressure reducing foam mattress   - Turn patient every two hours. Use wedge pillows to anchor patient  -Diaper with frequent changes  -Please give Tylenol preferentially for pain control      CONSULTS:      PT to evaluate and treat - 3 times a week     OT to evaluate and treat - 3 times a week     ST to evaluate and treat     Nutrition to evaluate and recommend diet      LABS:  NONE    Medications:  Discontinue all previous medication orders, if any. See new list below.       Tushar Ybarra   Home Medication Instructions ANNABEL:[id number]    Printed on:08/03/18 6530   Medication Information                      acetaminophen (TYLENOL) 500 MG tablet  Take 1 tablet (500 mg total) by mouth every 6 (six) hours as needed for Pain.             alfuzosin (UROXATRAL) 10 mg Tb24  Take 10 mg by mouth daily with breakfast.             ascorbic acid, vitamin C, (VITAMIN C) 500 MG tablet  Take 1 tablet (500 mg total) by mouth once daily.             ASPIRIN (ASPIR-81 ORAL)  Take 1 tablet by mouth once daily.              betaxolol (BETOPTIC S) 0.25 % DrpS  Place 1 drop into the left eye 2 (two) times daily.              calcium-vitamin D3 (CALCIUM 500 + D) 500 mg(1,250mg) -200 unit per tablet  Take 1 tablet by mouth 2 (two) times daily with meals.             dorzolamide (TRUSOPT) 2 % ophthalmic solution  Place 1 drop into both eyes 3 (three) times daily.              DULoxetine (CYMBALTA) 20 MG capsule  Take 20 mg by mouth once daily.             echothiophate 0.125 % ophthalmic solution  Place 1 drop into the left eye 2 (two) times daily.             FINASTERIDE ORAL  Take 5 mg by mouth once daily.              HYDROcodone-acetaminophen (NORCO) 5-325 mg per tablet  Take 1 tablet by mouth every 12 (twelve) hours as needed for Pain.             latanoprost 0.005 % ophthalmic solution  Place 1 drop into both eyes every evening.              MULTIVIT-IRON-MIN-FOLIC ACID 3,500-18-0.4 UNIT-MG-MG ORAL CHEW  Take by mouth.             naproxen sodium (ANAPROX) 220 MG tablet  Take 220 mg by mouth daily as needed.              PRAVASTATIN SODIUM (PRAVASTATIN ORAL)  Take 40 mg by mouth once daily.              pregabalin (LYRICA) 25 MG capsule  Take 25 mg by mouth once daily.              ranitidine (ZANTAC) 150 MG capsule  Take 150 mg by mouth 2 (two) times daily.             senna-docusate 8.6-50 mg (SENNA WITH DOCUSATE SODIUM)  8.6-50 mg per tablet  Take 1 tablet by mouth every other day.                       _________________________________  Wendy Ram MD  08/03/2018

## 2018-08-03 NOTE — PLAN OF CARE
NH orders forwarded to [facility] via [facility]. Spoke with Shreya who reports they are ready to receive patient. Report can be called to [phone]. Transportation set up with [facility]. Pickup scheduled for 1pm. Patient & daughter made aware at bedside. RN Pooja updated      08/03/18 1200   Final Note   Assessment Type Final Discharge Note   Discharge Disposition [facility]   What phone number can be called within the next 1-3 days to see how you are doing after discharge? [phone]   Discharge plans and expectations educations in teach back method with documentation complete? Yes   [facility] Referral Info   Post Acute Recommendation Other   Facility Name [facility]

## 2019-07-26 NOTE — ASSESSMENT & PLAN NOTE
CT-  Acute on chronic moderate to severe compression fracture of the L3 vertebral body which has progressed compared to previous MRI from 02/23/2018.  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  Stable remote severe L1 vertebral body compression fracture.  Stable remote mild-to-moderate T7 vertebral body compression fracture.    Ortho consult  Adequate pain control  PT/OT   Training, Safety Education & Training, Strengthening    Goals  Short term goals  Time Frame for Short term goals: 7-10 days   Short term goal 1: Bathing w/ supervision  Short term goal 2: LB dressing w/ supervision  Short term goal 3: UB dressing w/ supervision  Short term goal 4: Toileting w/ supervision  Short term goal 5: Functional transfers for ADL completion w/ supervision  Long term goals  Time Frame for Long term goals : LTG=STG  Patient Goals   Patient goals : Pt did not state a goal       Therapy Time   Individual Concurrent Group Co-treatment   Time In 0830, 1245         Time Out 0900, 1315          Minutes 30, 30               Timed Code Treatment Minutes:  30 + 30   Total Treatment Minutes:  60 minutes       Henny Chen, OT   Henny Chen OTR/L, [facility] #[id number]